# Patient Record
Sex: MALE | Race: WHITE | NOT HISPANIC OR LATINO | Employment: UNEMPLOYED | URBAN - METROPOLITAN AREA
[De-identification: names, ages, dates, MRNs, and addresses within clinical notes are randomized per-mention and may not be internally consistent; named-entity substitution may affect disease eponyms.]

---

## 2021-08-18 ENCOUNTER — APPOINTMENT (EMERGENCY)
Dept: RADIOLOGY | Facility: HOSPITAL | Age: 26
End: 2021-08-18

## 2021-08-18 ENCOUNTER — HOSPITAL ENCOUNTER (OUTPATIENT)
Facility: HOSPITAL | Age: 26
Setting detail: OBSERVATION
Discharge: HOME | End: 2021-08-19
Attending: EMERGENCY MEDICINE | Admitting: INTERNAL MEDICINE

## 2021-08-18 DIAGNOSIS — R20.2 PARESTHESIAS: ICD-10-CM

## 2021-08-18 DIAGNOSIS — R06.00 DYSPNEA: Primary | ICD-10-CM

## 2021-08-18 DIAGNOSIS — Z72.0 TOBACCO ABUSE: ICD-10-CM

## 2021-08-18 PROBLEM — A35 TETANUS: Status: ACTIVE | Noted: 2021-08-18

## 2021-08-18 PROBLEM — R06.02 SOB (SHORTNESS OF BREATH): Status: ACTIVE | Noted: 2021-08-18

## 2021-08-18 LAB
ALBUMIN SERPL BCP-MCNC: 4.1 G/DL (ref 3.5–5)
ALP SERPL-CCNC: 70 U/L (ref 46–116)
ALT SERPL W P-5'-P-CCNC: 35 U/L (ref 12–78)
AMPHETAMINES SERPL QL SCN: NEGATIVE
ANION GAP SERPL CALCULATED.3IONS-SCNC: 14 MMOL/L (ref 4–13)
APAP SERPL-MCNC: <2 UG/ML (ref 10–20)
AST SERPL W P-5'-P-CCNC: 17 U/L (ref 5–45)
BARBITURATES UR QL: NEGATIVE
BASOPHILS # BLD AUTO: 0.06 THOUSANDS/ΜL (ref 0–0.1)
BASOPHILS NFR BLD AUTO: 1 % (ref 0–1)
BENZODIAZ UR QL: NEGATIVE
BILIRUB SERPL-MCNC: 0.8 MG/DL (ref 0.2–1)
BUN SERPL-MCNC: 14 MG/DL (ref 5–25)
CALCIUM SERPL-MCNC: 9.4 MG/DL (ref 8.3–10.1)
CHLORIDE SERPL-SCNC: 104 MMOL/L (ref 100–108)
CK MB SERPL-MCNC: 0.7 NG/ML (ref 0–5)
CK MB SERPL-MCNC: <1 % (ref 0–2.5)
CK SERPL-CCNC: 164 U/L (ref 39–308)
CO2 SERPL-SCNC: 22 MMOL/L (ref 21–32)
COCAINE UR QL: NEGATIVE
CREAT SERPL-MCNC: 1.06 MG/DL (ref 0.6–1.3)
D DIMER PPP FEU-MCNC: 0.3 UG/ML FEU
EOSINOPHIL # BLD AUTO: 0.13 THOUSAND/ΜL (ref 0–0.61)
EOSINOPHIL NFR BLD AUTO: 1 % (ref 0–6)
ERYTHROCYTE [DISTWIDTH] IN BLOOD BY AUTOMATED COUNT: 11.1 % (ref 11.6–15.1)
ERYTHROCYTE [SEDIMENTATION RATE] IN BLOOD: <1 MM/HOUR (ref 0–14)
ETHANOL SERPL-MCNC: <3 MG/DL (ref 0–3)
GFR SERPL CREATININE-BSD FRML MDRD: 96 ML/MIN/1.73SQ M
GLUCOSE SERPL-MCNC: 101 MG/DL (ref 65–140)
HCT VFR BLD AUTO: 43.8 % (ref 36.5–49.3)
HGB BLD-MCNC: 15.5 G/DL (ref 12–17)
IMM GRANULOCYTES # BLD AUTO: 0.03 THOUSAND/UL (ref 0–0.2)
IMM GRANULOCYTES NFR BLD AUTO: 0 % (ref 0–2)
LYMPHOCYTES # BLD AUTO: 2.04 THOUSANDS/ΜL (ref 0.6–4.47)
LYMPHOCYTES NFR BLD AUTO: 22 % (ref 14–44)
MAGNESIUM SERPL-MCNC: 1.9 MG/DL (ref 1.6–2.6)
MCH RBC QN AUTO: 32.4 PG (ref 26.8–34.3)
MCHC RBC AUTO-ENTMCNC: 35.4 G/DL (ref 31.4–37.4)
MCV RBC AUTO: 91 FL (ref 82–98)
METHADONE UR QL: NEGATIVE
MONOCYTES # BLD AUTO: 0.96 THOUSAND/ΜL (ref 0.17–1.22)
MONOCYTES NFR BLD AUTO: 10 % (ref 4–12)
NEUTROPHILS # BLD AUTO: 6.23 THOUSANDS/ΜL (ref 1.85–7.62)
NEUTS SEG NFR BLD AUTO: 66 % (ref 43–75)
NRBC BLD AUTO-RTO: 0 /100 WBCS
OPIATES UR QL SCN: NEGATIVE
OXYCODONE+OXYMORPHONE UR QL SCN: NEGATIVE
PCP UR QL: NEGATIVE
PHOSPHATE SERPL-MCNC: 2.4 MG/DL (ref 2.7–4.5)
PLATELET # BLD AUTO: 249 THOUSANDS/UL (ref 149–390)
PMV BLD AUTO: 9.7 FL (ref 8.9–12.7)
POTASSIUM SERPL-SCNC: 3.9 MMOL/L (ref 3.5–5.3)
PROT SERPL-MCNC: 6.9 G/DL (ref 6.4–8.2)
RBC # BLD AUTO: 4.79 MILLION/UL (ref 3.88–5.62)
SALICYLATES SERPL-MCNC: <3 MG/DL (ref 3–20)
SODIUM SERPL-SCNC: 140 MMOL/L (ref 136–145)
THC UR QL: NEGATIVE
TROPONIN I SERPL-MCNC: <0.02 NG/ML
WBC # BLD AUTO: 9.45 THOUSAND/UL (ref 4.31–10.16)

## 2021-08-18 PROCEDURE — 85379 FIBRIN DEGRADATION QUANT: CPT | Performed by: EMERGENCY MEDICINE

## 2021-08-18 PROCEDURE — 84100 ASSAY OF PHOSPHORUS: CPT | Performed by: EMERGENCY MEDICINE

## 2021-08-18 PROCEDURE — 99245 OFF/OP CONSLTJ NEW/EST HI 55: CPT | Performed by: PHYSICIAN ASSISTANT

## 2021-08-18 PROCEDURE — 82550 ASSAY OF CK (CPK): CPT | Performed by: EMERGENCY MEDICINE

## 2021-08-18 PROCEDURE — 94150 VITAL CAPACITY TEST: CPT

## 2021-08-18 PROCEDURE — 99285 EMERGENCY DEPT VISIT HI MDM: CPT

## 2021-08-18 PROCEDURE — 85652 RBC SED RATE AUTOMATED: CPT | Performed by: EMERGENCY MEDICINE

## 2021-08-18 PROCEDURE — 99220 PR INITIAL OBSERVATION CARE/DAY 70 MINUTES: CPT | Performed by: INTERNAL MEDICINE

## 2021-08-18 PROCEDURE — 80307 DRUG TEST PRSMV CHEM ANLYZR: CPT | Performed by: EMERGENCY MEDICINE

## 2021-08-18 PROCEDURE — 80179 DRUG ASSAY SALICYLATE: CPT | Performed by: EMERGENCY MEDICINE

## 2021-08-18 PROCEDURE — 84484 ASSAY OF TROPONIN QUANT: CPT | Performed by: EMERGENCY MEDICINE

## 2021-08-18 PROCEDURE — 82553 CREATINE MB FRACTION: CPT | Performed by: EMERGENCY MEDICINE

## 2021-08-18 PROCEDURE — 80143 DRUG ASSAY ACETAMINOPHEN: CPT | Performed by: EMERGENCY MEDICINE

## 2021-08-18 PROCEDURE — 93005 ELECTROCARDIOGRAM TRACING: CPT

## 2021-08-18 PROCEDURE — 85025 COMPLETE CBC W/AUTO DIFF WBC: CPT | Performed by: EMERGENCY MEDICINE

## 2021-08-18 PROCEDURE — 36415 COLL VENOUS BLD VENIPUNCTURE: CPT | Performed by: EMERGENCY MEDICINE

## 2021-08-18 PROCEDURE — 99285 EMERGENCY DEPT VISIT HI MDM: CPT | Performed by: EMERGENCY MEDICINE

## 2021-08-18 PROCEDURE — 83735 ASSAY OF MAGNESIUM: CPT | Performed by: EMERGENCY MEDICINE

## 2021-08-18 PROCEDURE — 80053 COMPREHEN METABOLIC PANEL: CPT | Performed by: EMERGENCY MEDICINE

## 2021-08-18 PROCEDURE — 71045 X-RAY EXAM CHEST 1 VIEW: CPT

## 2021-08-18 PROCEDURE — 82077 ASSAY SPEC XCP UR&BREATH IA: CPT | Performed by: EMERGENCY MEDICINE

## 2021-08-18 RX ORDER — ALBUTEROL SULFATE 90 UG/1
2 AEROSOL, METERED RESPIRATORY (INHALATION) ONCE
Status: COMPLETED | OUTPATIENT
Start: 2021-08-18 | End: 2021-08-18

## 2021-08-18 RX ORDER — SODIUM CHLORIDE 9 MG/ML
75 INJECTION, SOLUTION INTRAVENOUS CONTINUOUS
Status: DISCONTINUED | OUTPATIENT
Start: 2021-08-18 | End: 2021-08-19 | Stop reason: HOSPADM

## 2021-08-18 RX ORDER — ACETAMINOPHEN 325 MG/1
650 TABLET ORAL EVERY 6 HOURS PRN
Status: DISCONTINUED | OUTPATIENT
Start: 2021-08-18 | End: 2021-08-19 | Stop reason: HOSPADM

## 2021-08-18 RX ORDER — CYCLOBENZAPRINE HCL 5 MG
5 TABLET ORAL 3 TIMES DAILY PRN
Status: DISCONTINUED | OUTPATIENT
Start: 2021-08-18 | End: 2021-08-19 | Stop reason: HOSPADM

## 2021-08-18 RX ORDER — DIPHENHYDRAMINE HYDROCHLORIDE 50 MG/ML
25 INJECTION INTRAMUSCULAR; INTRAVENOUS EVERY 8 HOURS SCHEDULED
Status: COMPLETED | OUTPATIENT
Start: 2021-08-18 | End: 2021-08-19

## 2021-08-18 RX ORDER — MAGNESIUM SULFATE 1 G/100ML
1 INJECTION INTRAVENOUS 2 TIMES DAILY
Status: COMPLETED | OUTPATIENT
Start: 2021-08-18 | End: 2021-08-19

## 2021-08-18 RX ORDER — ONDANSETRON 2 MG/ML
4 INJECTION INTRAMUSCULAR; INTRAVENOUS EVERY 6 HOURS PRN
Status: DISCONTINUED | OUTPATIENT
Start: 2021-08-18 | End: 2021-08-19 | Stop reason: HOSPADM

## 2021-08-18 RX ORDER — CYCLOBENZAPRINE HCL 5 MG
5 TABLET ORAL 3 TIMES DAILY PRN
COMMUNITY

## 2021-08-18 RX ORDER — METRONIDAZOLE 500 MG/1
500 TABLET ORAL EVERY 8 HOURS SCHEDULED
COMMUNITY

## 2021-08-18 RX ORDER — DIPHENHYDRAMINE HCL 25 MG
25 TABLET ORAL EVERY 8 HOURS PRN
COMMUNITY

## 2021-08-18 RX ORDER — METRONIDAZOLE 500 MG/1
500 TABLET ORAL EVERY 8 HOURS SCHEDULED
Status: DISCONTINUED | OUTPATIENT
Start: 2021-08-18 | End: 2021-08-19 | Stop reason: HOSPADM

## 2021-08-18 RX ORDER — DIPHENHYDRAMINE HCL 25 MG
25 TABLET ORAL EVERY 8 HOURS PRN
Status: DISCONTINUED | OUTPATIENT
Start: 2021-08-18 | End: 2021-08-18

## 2021-08-18 RX ADMIN — DIPHENHYDRAMINE HYDROCHLORIDE 25 MG: 50 INJECTION, SOLUTION INTRAMUSCULAR; INTRAVENOUS at 22:14

## 2021-08-18 RX ADMIN — SODIUM CHLORIDE 75 ML/HR: 0.9 INJECTION, SOLUTION INTRAVENOUS at 22:15

## 2021-08-18 RX ADMIN — METRONIDAZOLE 500 MG: 500 TABLET ORAL at 13:27

## 2021-08-18 RX ADMIN — METRONIDAZOLE 500 MG: 500 TABLET ORAL at 22:14

## 2021-08-18 RX ADMIN — ALBUTEROL SULFATE 2 PUFF: 90 AEROSOL, METERED RESPIRATORY (INHALATION) at 05:08

## 2021-08-18 RX ADMIN — MAGNESIUM SULFATE IN DEXTROSE 1 G: 10 INJECTION, SOLUTION INTRAVENOUS at 14:12

## 2021-08-18 RX ADMIN — SODIUM CHLORIDE 75 ML/HR: 0.9 INJECTION, SOLUTION INTRAVENOUS at 09:01

## 2021-08-18 RX ADMIN — DIPHENHYDRAMINE HYDROCHLORIDE 25 MG: 50 INJECTION, SOLUTION INTRAMUSCULAR; INTRAVENOUS at 14:11

## 2021-08-18 RX ADMIN — MAGNESIUM SULFATE IN DEXTROSE 1 G: 10 INJECTION, SOLUTION INTRAVENOUS at 22:00

## 2021-08-18 NOTE — CONSULTS
Consultation - Neurology   Sheldon Mayo 31 y o  male MRN: [id number]  Unit/Bed#: -01 Encounter: [id number]      Assessment/Plan     Paresthesias  Assessment & Plan   31 y o  RHD  male who [facility] on [facility]/18/21 due to episode of dyspnea and L sided chest "warmth" that started early this morning when pt was trying to fall asleep  Pt reports he started having issues a few weeks ago after being stung by a bee  Pt started breaking out in hives, so he took PO Benadryl  Then, about 2 weeks ago, pt was again stung by bees, so he again took PO Benadryl  Since then, pt has periodically been experiencing small areas of hives for which he will take PO Benadryl  Last Saturday, pt was driving to [place] from a [employer] party, when he experienced numbness and tingling in his hands, feet, and face (radiating into R side of neck)  Then, pt developed full body contractions which involved his torso, back, and extremities, so severe that when he was turning into the ED in [place], he had to turn the steering wheel of his care with his face and shoulders  Pt denies any body shaking, LOC, or bladder/bowl incontinence during this episode  Pt was admitted to a hospital in [place] from 8/14/21-8/17/21 and was diagnosed with Tetanus (pt was not up to date on Tdap and had a cut on his L hand)  He was treated with human tetanus immunoglobulin and was vaccinated for Tdap during this hospitalization  Pt then began driving up to [place] from [place], and about 2 hours into car ride, pt developed numbness and tingling in his hands, feet, and face again  After numbness and tingling developed, pt broke out in an erythematous rash in at least his LUE  Pt presented to [facility] at that time for further evaluation  By the time he was evaluated in the ED, his symptoms had resolved  Drug screen, CBC, CK, and routine chemistry were unremarkable, so pt was discharged from the ED   Pt then arrived at his sister's home after discharge  Pt reports when he was trying to sleep in the early morning hours, he was able to take a deep breath in, but he was having difficulty exhaling  He reports that every time he would start to drift into sleep, he would wake himself up because he wasn't remembering to exhale in sleep and that would jerk himself awake  This sensation was associated with L chest "warmth " Pt states he may have had some b/l hand paresthesias at that time but isn't entirely sure  Pt was brought to [facility] ED for further evaluation  Today, pt is unsure if he is experiencing paresthesias in his b/l hand fingertips  He is unsure if he is experiencing paresthesias versus if his hands are cold  During interview and examination, pt did experience some non-rhythmic twitching of his RUE that spontaneously resolved within a few seconds  On exam 8/18/21: Mental status intact, cranial nerves 2-12 grossly intact, strength 5/5 throughout all 4 extremities, sensation intact to light touch and pinprick x 4 extremities, gait normal, finger to nose intact, reflexes present     Symptoms may be attributed to tetanus    Workup  - Labs  - BMP: sodium 140, potassium 3 9, BUN 14, glucose 101, creatinine 1 06  - CBC: hemoglobin 15 5, hematocrit 43 8, WBC 9 45, platelets 187   Plan  - Start magnesium 1 g BID x 2 doses   - Start Benadryl 25 mg IV Q8H x 3 doses   - Medical management and supportive care per primary team   Correction of any metabolic or infectious disturbances  Recommendations for outpatient neurological follow up have yet to be determined  History of Present Illness     Reason for Consult / Principal Problem: paresthesias, dyspnea  Hx and PE limited by: N/A  HPI: Deshawn Tavares is a 31 y o  RHD  male who presents to [facility] on 8/18/21 due to episode of dyspnea and L sided chest "warmth" that started early this morning when pt was trying to fall asleep       Pt reports he started having issues a few weeks ago after being stung by a bee  Pt started breaking out in hives, so he took PO Benadryl  Then, about 2 weeks ago, pt was again stung by bees, so he again took PO Benadryl  Since then, pt has periodically been experiencing small areas of hives for which he will take PO Benadryl  Last Saturday, pt was driving back from a [employer] party, when he experienced numbness and tingling in his hands, feet, and face (radiating into R side of neck)  Then, pt developed full body contractions which involved his torso, back, and extremities, so severe that when he was turning into the ED in [place], he had to turn the steering wheel of his care with his face and shoulders  Pt denies any body shaking, LOC, or bladder/bowl incontinence during this episode  Pt was admitted to a hospital in [place] from 8/14/21-8/17/21 and was diagnosed with Tetanus (pt was not up to date on Tdap and had a cut on his L hand)  He was treated with human tetanus immunoglobulin and was vaccinated for Tdap during this hospitalization  Pt then began driving up to [place] from [place], and about 2 hours into car ride, pt developed numbness and tingling in his hands, feet, and face again  After numbness and tingling developed, pt broke out in an erythematous rash in at least his LUE, and pt experienced some throat tightness at this time  Pt presented to [address] [place] [address] at that time for further evaluation  By the time he was evaluated in the ED, his symptoms/associated symptoms had resolved  Drug screen, CBC, CK, and routine chemistry were unremarkable, so pt was discharged from the ED  Pt then arrived at his sister's home after discharge  Pt reports when he was trying to sleep in the early morning hours, he was able to take a deep breath in, but he was having difficulty exhaling   He reports that every time he would start to drift into sleep, he would wake himself up because he wasn't remembering to exhale in sleep and that would jerk himself awake  This sensation was associated with L chest "warmth " Pt states he may have had some b/l hand paresthesias at that time but isn't entirely sure  [facility] for further evaluation  Today, pt is unsure if he is experiencing paresthesias in his b/l hand fingertips  He is unsure if he is experiencing paresthesias versus if his hands are cold  He denies muscle spasms/contractures at this time  Pt denies weakness, headache, syncopal episode, seizure-like activity, bladder/bowel issues, nausea, vomitting, chest pain, and SOB at rest  Pt endorses some feelings of L lower chest/LUQ of abdomen tightness where he was experiencing the area of warmth overnight  During interview and examination, pt did experience some non-rhythmic twitching of his RUE that spontaneously resolved within a few seconds  Pt denies having a history of seizures  Pt denies recent camping trip  Pt denies recent travel outside of the country  Pt has not been vaccinated for COVID  Inpatient consult to Neurology  Consult performed by: Niels Schroeder PA-C  Consult ordered by: Lelia Nails MD          Review of Systems   Constitutional: Negative for chills and fever  HENT: Negative for trouble swallowing  Eyes: Negative for pain and visual disturbance  Respiratory: Negative for cough and shortness of breath  Cardiovascular: Negative for chest pain and palpitations  Gastrointestinal: Negative for nausea and vomiting  Genitourinary: Negative for difficulty urinating and dysuria  Musculoskeletal: Negative for gait problem and neck pain  Neurological: Negative for dizziness, tremors, seizures, syncope, facial asymmetry, speech difficulty, weakness, light-headedness, numbness and headaches  Psychiatric/Behavioral: Negative for agitation and confusion  Historical Information   History reviewed  No pertinent past medical history  History reviewed   No pertinent surgical history  Social History   Social History     Substance and Sexual Activity   Alcohol Use Not Currently     Social History     Substance and Sexual Activity   Drug Use Not on file     E-Cigarette/Vaping     E-Cigarette/Vaping Substances     Social History     Tobacco Use   Smoking Status Current Some Day Smoker   Smokeless Tobacco Former User     Family History: History reviewed  No pertinent family history  Review of previous medical records was  completed  Meds/Allergies   current meds:   Current Facility-Administered Medications   Medication Dose Route Frequency    acetaminophen (TYLENOL) tablet 650 mg  650 mg Oral Q6H PRN    cyclobenzaprine (FLEXERIL) tablet 5 mg  5 mg Oral TID PRN    diphenhydrAMINE (BENADRYL) injection 25 mg  25 mg Intravenous Q8H [facility]    magnesium sulfate IVPB (premix) SOLN 1 g  1 g Intravenous BID    metroNIDAZOLE (FLAGYL) tablet 500 mg  500 mg Oral Q8H [facility]    ondansetron (ZOFRAN) injection 4 mg  4 mg Intravenous Q6H PRN    sodium chloride 0 9 % infusion  75 mL/hr Intravenous Continuous    and PTA meds:   Prior to Admission Medications   Prescriptions Last Dose Informant Patient Reported? Taking? cyclobenzaprine (FLEXERIL) 5 mg tablet   Yes Yes   Sig: Take 5 mg by mouth 3 (three) times a day as needed for muscle spasms   diphenhydrAMINE (BENADRYL) 25 mg tablet   Yes Yes   Sig: Take 25 mg by mouth every 8 (eight) hours as needed for itching   metroNIDAZOLE (FLAGYL) 500 mg tablet   Yes Yes   Sig: Take 500 mg by mouth every 8 (eight) hours Every 8 hours for 8 days      Facility-Administered Medications: None       Allergies   Allergen Reactions    Bee Venom Hives       Objective   Vitals:Blood pressure 130/82, pulse 79, temperature 98 1 °F (36 7 °C), temperature source Oral, resp  rate 16, height 5' 7" (1 702 m), weight 74 5 kg (164 lb 3 9 oz), SpO2 97 %  ,Body mass index is 25 72 kg/m²    No intake or output data in the 24 hours ending 08/18/21 1350    Invasive Devices: Invasive Devices     Peripheral Intravenous Line            Peripheral IV 08/18/21 Left Antecubital <1 day                Physical Exam  Vitals and nursing note reviewed  Constitutional:       General: He is not in acute distress  Appearance: He is not diaphoretic  HENT:      Head: Normocephalic and atraumatic  Nose: Nose normal  No congestion or rhinorrhea  Mouth/Throat:      Mouth: Mucous membranes are moist       Pharynx: Oropharynx is clear  No oropharyngeal exudate or posterior oropharyngeal erythema  Eyes:      General: No scleral icterus  Right eye: No discharge  Left eye: No discharge  Extraocular Movements: Extraocular movements intact  Conjunctiva/sclera: Conjunctivae normal       Pupils: Pupils are equal, round, and reactive to light  Cardiovascular:      Rate and Rhythm: Normal rate  Pulmonary:      Effort: Pulmonary effort is normal    Musculoskeletal:         General: Normal range of motion  Cervical back: Normal range of motion  No rigidity or tenderness  Right lower leg: No edema  Left lower leg: No edema  Skin:     Findings: Erythema and rash (small area of erythematous maculopapular rash on R upper torso, nonpruritic) present  Neurological:      Mental Status: He is alert and oriented to person, place, and time  Coordination: Finger-Nose-Finger Test normal       Deep Tendon Reflexes: Strength normal       Reflex Scores:       Bicep reflexes are 2+ on the right side and 2+ on the left side  Brachioradialis reflexes are 1+ on the right side and 1+ on the left side  Patellar reflexes are 3+ on the right side and 3+ on the left side  Achilles reflexes are 1+ on the right side and 1+ on the left side  Psychiatric:         Speech: Speech normal       Comments: Slightly flat affect, cooperative with exam        Neurologic Exam     Mental Status   Oriented to person, place, and time  Follows 2 step commands  Attention: normal  Concentration: normal    Speech: speech is normal   Level of consciousness: alert    Cranial Nerves     CN II   Visual fields full to confrontation  Visual acuity: normal  Right visual field deficit: none  Left visual field deficit: none     CN III, IV, VI   Pupils are equal, round, and reactive to light  Right pupil: Size: 3 mm  Shape: regular  Reactivity: brisk  Consensual response: intact  Accommodation: intact  Left pupil: Size: 3 mm  Shape: regular  Reactivity: brisk  Consensual response: intact  Accommodation: intact  CN III: no CN III palsy  CN VI: no CN VI palsy  Nystagmus: none     CN V   Facial sensation intact  CN VII   Facial expression full, symmetric  CN VIII   Hearing impaired: grossly intact     CN IX, X   CN IX normal    CN X normal    Palate: symmetric    CN XI   CN XI normal    Right sternocleidomastoid strength: normal  Left sternocleidomastoid strength: normal  Right trapezius strength: normal  Left trapezius strength: normal    CN XII   CN XII normal    Tongue: not atrophic  Fasciculations: absent  Tongue deviation: none    Motor Exam   Muscle bulk: normal  Overall muscle tone: normal  Right arm pronator drift: absent  Left arm pronator drift: absent    Strength   Strength 5/5 throughout   - non-rhythmic RUE twitching lasting about 15 seconds, stopped spontaneously      Sensory Exam   Light touch normal    Vibration normal    Pinprick normal      Gait, Coordination, and Reflexes     Gait  Gait: (deferred for patient safety )    Coordination   Finger to nose coordination: normal    Tremor   Resting tremor: absent  Intention tremor: absent    Reflexes   Right brachioradialis: 1+  Left brachioradialis: 1+  Right biceps: 2+  Left biceps: 2+  Right patellar: 3+  Left patellar: 3+  Right achilles: 1+  Left achilles: 1+  Right plantar: equivocal  Left plantar: equivocal      Lab Results:   CBC:   Results from last 7 days   Lab Units 08/18/21  0249   WBC Thousand/uL 9  45   RBC Million/uL 4 79   HEMOGLOBIN g/dL 15 5   HEMATOCRIT % 43 8   MCV fL 91   PLATELETS Thousands/uL 249   , BMP/CMP:   Results from last 7 days   Lab Units 08/18/21  0249   SODIUM mmol/L 140   POTASSIUM mmol/L 3 9   CHLORIDE mmol/L 104   CO2 mmol/L 22   BUN mg/dL 14   CREATININE mg/dL 1 06   CALCIUM mg/dL 9 4   AST U/L 17   ALT U/L 35   ALK PHOS U/L 70   EGFR ml/min/1 73sq m 96     Imaging Studies: I have personally reviewed pertinent reports  and I have personally reviewed pertinent films in PACS CXR 8/18/21  EKG, Pathology, and Other Studies: I have personally reviewed pertinent reports  EKG 8/18/21      Code Status: Level 1 - Full Code    Counseling / Coordination of Care  Total time spent today 60 minutes  Greater than 50% of total time was spent with the patient and / or family counseling and / or coordination of care   A description of the counseling / coordination of care: discussion of potential tetanus versus immune response from bee stings, plan for treatment and workup

## 2021-08-18 NOTE — H&P
[place]  H&P- [address] 1995, 31 y o  male MRN: [id number]  Unit/Bed#: -01 Encounter: [id number]  Primary Care Provider: No primary care provider on file  Date and time admitted to hospital: 8/18/2021  2:37 AM    * SOB (shortness of breath)  Assessment & Plan  · While in the car had numbness of the face/neck with sudden shortness of breath  · Describes difficulty with exhaling not inhaling  · Denies prior history of anxiety  · Has resolved at this time in the ED  · Chest x-ray is unremarkable  · D-dimer negative  · 98% on room air  · NIF and vital capacity Q shift  · Outpatient sleep study and pulmonary follow-up    Paresthesias  Assessment & Plan  · Prior to ED arrival had tingling in hands, feet and face  Resolved now  Has had multiple episodes of this over the last week  · Had been admitted in [place] with suspected tetanus  Seen at [facility] yesterday in the ER and discharged  · Neuro checks q 4 hours  · Consult neurology    Tetanus  Assessment & Plan  · Had been treated at hospital in [place] for presumed tetanus from 08/15 through 8/17  · Symptoms:  2 episodes where his entire body became stiff/tight, tingling in hands/feet  · Denies LOC, seizure, shortness of breath at that time  · Received tetanus shot, IVIG and started on p o  Flagyl    Anticipated length of stay less than 2 midnights    Chief Complaint   Patient presents with    Shortness of Breath     Pt c/o of getting osb while talking and ferels tingling in his lips and fingers  HPI:  [address] is a 31 y o  male who presented to ER with episode of shortness of breath, tingling in lips and fingers while driving from [place] to [place]  Pt has been evaluated in 2 EDs in the past week during his drive for similar episodes prior to arrival to [facility]   Pt was discharged from [address] [place] [address] on 8/18/21 after unremarkable workup in urine toxicology, CBC, CK, and routine chemistry  Patient is driving from [place] to [place] to attend a wedding  Patient during his drive developed an episode of whole body paresthesias and stiffness and went to ER in [place] where he was treated for tetanus  Patient was admitted 8/14-8/17/21 at [facility] where he received tetanus immunoglobulin and vaccine and was discharged on Flagyl, Flexeril and Benadryl p r n  Patient was discharged from [facility] and on his way back again had feeling of tingling of hands, feet and face and patient went to [facility] ER where he had unremarkable workup and was discharged home  Patient went to his sister's house in [facility] and states he felt short of breath when he was trying to sleep  He states that he felt like he was not able to exhale completely and came to ER  Patient had negative D-dimer, troponin, UDS is negative in ER and ER provider spoke with Neurology who recommended admitting for observation  Patient does not have any tingling/paresthesias/numbness or weakness of extremities  Denies any chest pain, shortness of breath, nausea, vomiting, fever, chills, abdominal pain  Patient is not COVID vaccinated  Historical Information   History reviewed  No pertinent past medical history  History reviewed  No pertinent surgical history  Social History   Social History     Substance and Sexual Activity   Alcohol Use Not Currently     Social History     Substance and Sexual Activity   Drug Use Not on file     Social History     Tobacco Use   Smoking Status Current Some Day Smoker   Smokeless Tobacco Former User     History reviewed  No pertinent family history      Meds/Allergies   Allergies   Allergen Reactions    Bee Venom Hives       Meds:    Current Facility-Administered Medications:     acetaminophen (TYLENOL) tablet 650 mg, 650 mg, Oral, Q6H PRN, Jas Nance MD    ondansetron (ZOFRAN) injection 4 mg, 4 mg, Intravenous, Q6H PRN, Jimmy Kerby Litten, MD    sodium chloride 0 9 % infusion, 75 mL/hr, Intravenous, Continuous, Grover Rider MD, Last Rate: 75 mL/hr at 08/18/21 0901, 75 mL/hr at 08/18/21 0901    Medications Prior to Admission   Medication    cyclobenzaprine (FLEXERIL) 5 mg tablet    diphenhydrAMINE (BENADRYL) 25 mg tablet    metroNIDAZOLE (FLAGYL) 500 mg tablet         Review of Systems   Constitutional: Positive for activity change  HENT: Negative  Eyes: Negative  Respiratory: Positive for shortness of breath  Cardiovascular: Negative  Gastrointestinal: Negative  Endocrine: Negative  Genitourinary: Negative  Musculoskeletal: Negative  Skin: Negative  Allergic/Immunologic: Negative  Neurological: Negative  Hematological: Negative  Psychiatric/Behavioral: Negative  Current Vitals:   Blood Pressure: 130/82 (08/18/21 0756)  Pulse: 79 (08/18/21 0756)  Temperature: 98 1 °F (36 7 °C) (08/18/21 0756)  Temp Source: Oral (08/18/21 0756)  Respirations: 16 (08/18/21 0756)  Height: 5' 7" (170 2 cm) (08/18/21 0424)  Weight - Scale: 74 5 kg (164 lb 3 9 oz) (08/18/21 0753)  SpO2: 97 % (08/18/21 0756)  SPO2 RA Rest      ED to Hosp-Admission (Current) from 8/18/2021 in [address] [facility]   SpO2  97 %   SpO2 Activity  At Rest   O2 Device  None (Room air)   O2 Flow Rate  --        No intake or output data in the 24 hours ending 08/18/21 1057  Body mass index is 25 72 kg/m²  Physical Exam  Vitals and nursing note reviewed  Constitutional:       General: He is not in acute distress  Appearance: He is well-developed  HENT:      Head: Normocephalic and atraumatic  Eyes:      General: No scleral icterus  Conjunctiva/sclera: Conjunctivae normal       Pupils: Pupils are equal, round, and reactive to light  Neck:      Thyroid: No thyromegaly  Vascular: No JVD  Cardiovascular:      Rate and Rhythm: Normal rate and regular rhythm        Heart sounds: Normal heart sounds  Pulmonary:      Effort: Pulmonary effort is normal  No respiratory distress  Breath sounds: Normal breath sounds  No wheezing or rales  Chest:      Chest wall: No tenderness  Abdominal:      General: Bowel sounds are normal  There is no distension  Palpations: Abdomen is soft  There is no mass  Tenderness: There is no abdominal tenderness  There is no guarding or rebound  Musculoskeletal:         General: No tenderness or deformity  Normal range of motion  Cervical back: Normal range of motion and neck supple  Lymphadenopathy:      Cervical: No cervical adenopathy  Skin:     General: Skin is warm  Coloration: Skin is not pale  Findings: No erythema or rash  Neurological:      General: No focal deficit present  Mental Status: He is alert and oriented to person, place, and time  Mental status is at baseline  Cranial Nerves: No cranial nerve deficit  Coordination: Coordination normal       Comments: No slurred speech  No pronator drift    No sensory or motor deficits   Psychiatric:         Behavior: Behavior normal          Judgment: Judgment normal          Lab Results:   CBC:   Lab Results   Component Value Date    WBC 9 45 08/18/2021    HGB 15 5 08/18/2021    HCT 43 8 08/18/2021    MCV 91 08/18/2021     08/18/2021    MCH 32 4 08/18/2021    MCHC 35 4 08/18/2021    RDW 11 1 (L) 08/18/2021    MPV 9 7 08/18/2021    NRBC 0 08/18/2021     CMP:  Lab Results   Component Value Date     08/18/2021    CO2 22 08/18/2021    BUN 14 08/18/2021    CREATININE 1 06 08/18/2021    CALCIUM 9 4 08/18/2021    AST 17 08/18/2021    ALT 35 08/18/2021    ALKPHOS 70 08/18/2021    EGFR 96 08/18/2021     Lab Results   Component Value Date    TROPONINI <0 02 08/18/2021    CKMB 0 7 08/18/2021    CKTOTAL 164 08/18/2021     Coagulation: No results found for: PT, INR, APTT Urinalysis:No results found for: Juan Miguel Brown, [facility], LEUKOCYTESUR, NITRITE, PROTEINUA, JOANA Leon, BLOODU   Amylase: No results found for: AMYLASE  Lipase: No results found for: LIPASE     Imaging: XR chest 1 view portable    Result Date: 8/18/2021  Narrative: CHEST INDICATION:   Shortness of breath  COMPARISON:  None EXAM PERFORMED/VIEWS:  XR CHEST PORTABLE FINDINGS: Cardiomediastinal silhouette appears unremarkable  The lungs are clear  No pneumothorax or pleural effusion  Osseous structures appear within normal limits for patient age  Impression: No acute cardiopulmonary disease  Workstation performed: [id number]     EKG, Pathology, and Other Studies: I have personally reviewed the results  VTE Pharmacologic Prophylaxis: Reason for no pharmacologic prophylaxis Patient is ambulatory  VTE Mechanical Prophylaxis: reason for no mechanical VTE prophylaxis Patient is ambulatory    Code Status: Level 1 - Full Code    Counseling / Coordination of Care  Total floor / unit time spent today 75 minutes  Greater than 50% of total time was spent with the patient and / or family counseling and / or coordination of care       "This note has been constructed using a voice recognition system"      Tamara Boo MD  8/18/2021, 10:57 AM

## 2021-08-18 NOTE — RESPIRATORY THERAPY NOTE
As per report from Gen Covarrubias RRT pt's NIF and VC were done at [id number] 8/18/21 values were NIF-60 cmh2o/VC 6 8L (see resp   Assessment flow sheet)

## 2021-08-18 NOTE — OCCUPATIONAL THERAPY NOTE
Occupational Therapy Screen    Patient Name: Kiera Clarke  MYZBG'O Date: 8/18/2021 08/18/21 1110   OT Last Visit   OT Visit Date 08/18/21   Note Type   Note type Screen   Assessment   Assessment OT orders received and chart reviewed  Spoke to Yanick Jiang who states pt is currently independent in room  Spoke to pt who confirms the same and does not express any OT needs  Recommend pt continue to be OOB for meals, ambulation to/from BR, perform self care tasks, and mobility in hallway with nursing  At this time, OT recommendations at time of discharge are return home at prior level of function  No acute OT needs identified at this time; please re-consult if OT needs arise during remainder of hospital stay       [place], MS, OTR/L

## 2021-08-18 NOTE — ASSESSMENT & PLAN NOTE
· Prior to ED arrival had tingling in hands, feet and face  Resolved now  Has had multiple episodes of this over the last week  · Had been admitted in [place] with suspected tetanus    Seen at [facility] yesterday in the ER and discharged  · Neuro checks q 4 hours  · Consult neurology

## 2021-08-18 NOTE — ASSESSMENT & PLAN NOTE
· Had been treated at hospital in [place] for presumed tetanus from 08/15 through 8/17  · Symptoms:  2 episodes where his entire body became stiff/tight, tingling in hands/feet  · Denies LOC, seizure, shortness of breath at that time  · Received tetanus shot, IVIG and started on p o   Flagyl

## 2021-08-18 NOTE — PLAN OF CARE
Problem: PAIN - ADULT  Goal: Verbalizes/displays adequate comfort level or baseline comfort level  Description: Interventions:  - Encourage patient to monitor pain and request assistance  - Assess pain using appropriate pain scale  - Administer analgesics based on type and severity of pain and evaluate response  - Implement non-pharmacological measures as appropriate and evaluate response  - Consider cultural and social influences on pain and pain management  - Notify physician/advanced practitioner if interventions unsuccessful or patient reports new pain  Outcome: Progressing     Problem: INFECTION - ADULT  Goal: Absence or prevention of progression during hospitalization  Description: INTERVENTIONS:  - Assess and monitor for signs and symptoms of infection  - Monitor lab/diagnostic results  - Monitor all insertion sites, i e  indwelling lines, tubes, and drains  - Monitor endotracheal if appropriate and nasal secretions for changes in amount and color  - [place] appropriate cooling/warming therapies per order  - Administer medications as ordered  - Instruct and encourage patient and family to use good hand hygiene technique  - Identify and instruct in appropriate isolation precautions for identified infection/condition  Outcome: Progressing  Goal: Absence of fever/infection during neutropenic period  Description: INTERVENTIONS:  - Monitor WBC    Outcome: Progressing     Problem: SAFETY ADULT  Goal: Patient will remain free of falls  Description: INTERVENTIONS:  - Educate patient/family on patient safety including physical limitations  - Instruct patient to call for assistance with activity   - Consult OT/PT to assist with strengthening/mobility   - Keep Call bell within reach  - Keep bed low and locked with side rails adjusted as appropriate  - Keep care items and personal belongings within reach  - Initiate and maintain comfort rounds  - Make Fall Risk Sign visible to staff  - Offer Toileting every 2 Hours, in advance of need  - Initiate/Maintain alarm  - Obtain necessary fall risk management equipment:   - Apply yellow socks and bracelet for high fall risk patients  - Consider moving patient to room near nurses station  Outcome: Progressing  Goal: Maintain or return to baseline ADL function  Description: INTERVENTIONS:  -  Assess patient's ability to carry out ADLs; assess patient's baseline for ADL function and identify physical deficits which impact ability to perform ADLs (bathing, care of mouth/teeth, toileting, grooming, dressing, etc )  - Assess/evaluate cause of self-care deficits   - Assess range of motion  - Assess patient's mobility; develop plan if impaired  - Assess patient's need for assistive devices and provide as appropriate  - Encourage maximum independence but intervene and supervise when necessary  - Involve family in performance of ADLs  - Assess for home care needs following discharge   - Consider OT consult to assist with ADL evaluation and planning for discharge  - Provide patient education as appropriate  Outcome: Progressing  Goal: Maintains/Returns to pre admission functional level  Description: INTERVENTIONS:  - Perform BMAT or MOVE assessment daily    - Set and communicate daily mobility goal to care team and patient/family/caregiver  - Collaborate with rehabilitation services on mobility goals if consulted  - Perform Range of Motion 3 times a day  - Reposition patient every 3 hours    - Dangle patient 3 times a day  - Stand patient 3 times a day  - Ambulate patient 3 times a day  - Out of bed to chair 3 times a day   - Out of bed for meals 3  Problem: DISCHARGE PLANNING  Goal: Discharge to home or other facility with appropriate resources  Description: INTERVENTIONS:  - Identify barriers to discharge w/patient and caregiver  - Arrange for needed discharge resources and transportation as appropriate  - Identify discharge learning needs (meds, wound care, etc )  - Arrange for interpretive services to assist at discharge as needed  - Refer to Case Management Department for coordinating discharge planning if the patient needs post-hospital services based on physician/advanced practitioner order or complex needs related to functional status, cognitive ability, or social support system  Outcome: Progressing     Problem: Knowledge Deficit  Goal: Patient/family/caregiver demonstrates understanding of disease process, treatment plan, medications, and discharge instructions  Description: Complete learning assessment and assess knowledge base  Interventions:  - Provide teaching at level of understanding  - Provide teaching via preferred learning methods  Outcome: Progressing     Problem: NEUROSENSORY - ADULT  Goal: Achieves stable or improved neurological status  Description: INTERVENTIONS  - Monitor and report changes in neurological status  - Monitor vital signs such as temperature, blood pressure, glucose, and any other labs ordered   - Initiate measures to prevent increased intracranial pressure  - Monitor for seizure activity and implement precautions if appropriate      Outcome: Progressing  Goal: Achieves maximal functionality and self care  Description: INTERVENTIONS  - Monitor swallowing and airway patency with patient fatigue and changes in neurological status  - Encourage and assist patient to increase activity and self care     - Encourage visually impaired, hearing impaired and aphasic patients to use assistive/communication devices  Outcome: Progressing     Problem: METABOLIC, FLUID AND ELECTROLYTES - ADULT  Goal: Electrolytes maintained within normal limits  Description: INTERVENTIONS:  - Monitor labs and assess patient for signs and symptoms of electrolyte imbalances  - Administer electrolyte replacement as ordered  - Monitor response to electrolyte replacements, including repeat lab results as appropriate  - Instruct patient on fluid and nutrition as appropriate  Outcome: Progressing  Goal: Fluid balance maintained  Description: INTERVENTIONS:  - Monitor labs   - Monitor I/O and WT  - Instruct patient on fluid and nutrition as appropriate  - Assess for signs & symptoms of volume excess or deficit  Outcome: Progressing     Problem: MUSCULOSKELETAL - ADULT  Goal: Maintain or return mobility to safest level of function  Description: INTERVENTIONS:  - Assess patient's ability to carry out ADLs; assess patient's baseline for ADL function and identify physical deficits which impact ability to perform ADLs (bathing, care of mouth/teeth, toileting, grooming, dressing, etc )  - Assess/evaluate cause of self-care deficits   - Assess range of motion  - Assess patient's mobility  - Assess patient's need for assistive devices and provide as appropriate  - Encourage maximum independence but intervene and supervise when necessary  - Involve family in performance of ADLs  - Assess for home care needs following discharge   - Consider OT consult to assist with ADL evaluation and planning for discharge  - Provide patient education as appropriate  Outcome: Progressing  Goal: Maintain proper alignment of affected body part  Description: INTERVENTIONS:  - Support, maintain and protect limb and body alignment  - Provide patient/ family with appropriate education  Outcome: Progressing    times a day  - Out of bed for toileting  - Record patient progress and toleration of activity level   Outcome: Progressing

## 2021-08-18 NOTE — CASE MANAGEMENT
LOS 0  Unplanned readmission risk score: N/A  Not a bundle  Met with pt to discuss the role of CM and to discuss any help pt may need prior to dc  Pt lives alone in [place]; pt is in [place] visiting family  Pt reports his parents live next door  Pt has a 1st floor home with 3 [address]  Pt performed ADL's indptly pta, no use of DME  No hx of HHC or rehab  No hx of mental health or D&A treatment  Pt's preferred pharmacy is [facility] or [facility]; no specific location  Pt drives  Pt reports not having insurance; referral made to [facility]  Contact: Genoveva Roland (sister) [phone]  No POA or living will--declined information  Genoveva Roland will transport home at [place]  CM reviewed d/c planning process including the following: identifying help at home, patient preference for d/c planning needs, availability of treatment team to discuss questions or concerns patient and/or family may have regarding understanding medications and recognizing signs and symptoms once discharged  CM also encouraged patient to follow up with all recommended appointments after discharge  Patient advised of importance for patient and family to participate in managing patients medical well being

## 2021-08-18 NOTE — ED PROVIDER NOTES
History  Chief Complaint   Patient presents with    Shortness of Breath     Pt c/o of getting osb while talking and ferels tingling in his lips and fingers  32 yo M with no significant PMH, no drug use, social ETOH/social cigars presents to ED for eval of dyspnea, and tingling in hands, feet and face  BIBA  Apparently, this is his third hospital visit this week  He is from [place], he was traveling up to [place] for a family wedding  On the drive up, he had an episode where his whole body got stiff and tight, no LOC or seizure, and he felt like his throat was tight, he was eval'd at ER in [place] and treated for tetanus (he had a cut on his hand about a week prior, and was out of date with tetanus)  He was inpt for about 3 days  Had a 2nd episode in hospital prior to admission  No repeat episodes like that since that time  At d/c 8/17/21 3pm he was feeling improved  No SOB at that time  On continued drive up [facility], he had tingling in hands/feet again, was eval'd at [facility] in ER, had neg workup and was d/c about midnight 8/18/21  He had repeat episode on drive, which this time included numbness down face into neck, and felt like he was having dyspnea, which was new  He describes it as he feels like he has to stop himself from completely exhaling  Inhalation is not an issue  No CP or cough or fever  He is not COVID vaccinated  No GI/ sx  No difficulty with ambulation, no weakness  No seizure history  No travel recently out of country  Mild diarrheal illness a few weeks ago that he attributed to bad food  Had a few bee stings a week ago that resulted in hives that resolved with benadryl, and no anaphylaxis        History provided by:  Patient, medical records and EMS personnel   used: No    Shortness of Breath  Severity:  Unable to specify  Onset quality:  Gradual  Timing:  Intermittent  Progression:  Waxing and waning  Chronicity:  Recurrent  Relieved by:  Nothing  Worsened by: Nothing  Ineffective treatments:  None tried  Associated symptoms: no abdominal pain, no chest pain, no cough, no diaphoresis, no ear pain, no fever, no headaches, no neck pain, no rash, no sore throat and no vomiting    Risk factors: prolonged immobilization    Risk factors: no hx of PE/DVT        None       No past medical history on file  No past surgical history on file  No family history on file  I have reviewed and agree with the history as documented  No existing history information found  No existing history information found  Social History     Tobacco Use    Smoking status: Not on file   Substance Use Topics    Alcohol use: Not on file    Drug use: Not on file       Review of Systems   Constitutional: Negative for chills, diaphoresis, fatigue, fever and unexpected weight change  HENT: Negative for congestion, ear pain, rhinorrhea, sore throat, trouble swallowing and voice change  Eyes: Negative for pain and visual disturbance  Respiratory: Positive for chest tightness and shortness of breath  Negative for cough  Cardiovascular: Negative for chest pain, palpitations and leg swelling  Gastrointestinal: Negative for abdominal pain, blood in stool, constipation, diarrhea, nausea and vomiting  Genitourinary: Negative for difficulty urinating and hematuria  Musculoskeletal: Negative for arthralgias, back pain and neck pain  Skin: Negative for rash  Neurological: Positive for numbness  Negative for dizziness, syncope, light-headedness and headaches  Psychiatric/Behavioral: Negative for confusion and suicidal ideas  The patient is not nervous/anxious  Physical Exam  Physical Exam  Vitals and nursing note reviewed  Constitutional:       General: He is not in acute distress  Appearance: He is well-developed  He is not ill-appearing or diaphoretic  HENT:      Head: Normocephalic and atraumatic        Right Ear: External ear normal       Left Ear: External ear normal  Nose: Nose normal    Eyes:      General: Lids are normal  No visual field deficit or scleral icterus  Right eye: No discharge  Left eye: No discharge  Extraocular Movements: Extraocular movements intact  Right eye: No nystagmus  Left eye: No nystagmus  Conjunctiva/sclera: Conjunctivae normal       Pupils: Pupils are equal, round, and reactive to light  Neck:      Vascular: No JVD  Trachea: No tracheal deviation  Cardiovascular:      Rate and Rhythm: Normal rate and regular rhythm  Heart sounds: Normal heart sounds  No murmur heard  No friction rub  No gallop  Pulmonary:      Effort: Pulmonary effort is normal  No tachypnea, accessory muscle usage or respiratory distress  Breath sounds: Normal breath sounds  No stridor  No wheezing or rales  Chest:      Chest wall: No tenderness  Abdominal:      General: Bowel sounds are normal  There is no distension  Palpations: Abdomen is soft  Tenderness: There is no abdominal tenderness  There is no guarding or rebound  Musculoskeletal:         General: No tenderness or deformity  Normal range of motion  Cervical back: Normal range of motion and neck supple  Right lower leg: No tenderness  No edema  Left lower leg: No tenderness  No edema  Lymphadenopathy:      Cervical: No cervical adenopathy  Skin:     General: Skin is warm and dry  Capillary Refill: Capillary refill takes less than 2 seconds  Findings: No rash  Neurological:      General: No focal deficit present  Mental Status: He is alert and oriented to person, place, and time  GCS: GCS eye subscore is 4  GCS verbal subscore is 5  GCS motor subscore is 6  Cranial Nerves: Cranial nerves are intact  No cranial nerve deficit, dysarthria or facial asymmetry  Sensory: Sensation is intact  No sensory deficit  Motor: Motor function is intact  No weakness        Coordination: Coordination is intact  Coordination normal       Deep Tendon Reflexes:      Reflex Scores:       Patellar reflexes are 2+ on the right side and 3+ on the left side  Psychiatric:         Behavior: Behavior normal          Vital Signs  ED Triage Vitals   Temperature Pulse Respirations Blood Pressure SpO2   08/18/21 0305 08/18/21 0238 08/18/21 0238 08/18/21 0238 08/18/21 0238   97 8 °F (36 6 °C) 78 17 136/84 96 %      Temp src Heart Rate Source Patient Position - Orthostatic VS BP Location FiO2 (%)   -- -- -- -- --             Pain Score       --                  Vitals:    08/18/21 0400 08/18/21 0530 08/18/21 0600 08/18/21 0630   BP: 119/74 133/75 153/70 137/73   Pulse: 67 65 73 67         Visual Acuity      ED Medications  Medications   albuterol (PROVENTIL HFA,VENTOLIN HFA) inhaler 2 puff (2 puffs Inhalation Given 8/18/21 0508)       Diagnostic Studies  Results Reviewed     Procedure Component Value Units Date/Time    Rapid drug screen, urine [[id number]] Collected: 08/18/21 0705    Lab Status: In process Specimen: Urine, Clean Catch Updated: 08/18/21 0710    D-dimer, quantitative [[id number]]  (Normal) Collected: 08/18/21 0516    Lab Status: Final result Specimen: Blood from Arm, Left Updated: 08/18/21 0546     D-Dimer, Quant 0 30 ug/ml FEU     Magnesium [[id number]]  (Normal) Collected: 08/18/21 0249    Lab Status: Final result Specimen: Blood from Arm, Left Updated: 08/18/21 0446     Magnesium 1 9 mg/dL     Phosphorus [[id number]]  (Abnormal) Collected: 08/18/21 0249    Lab Status: Final result Specimen: Blood from Arm, Left Updated: 08/18/21 0446     Phosphorus 2 4 mg/dL     Salicylate level [[id number]]  (Abnormal) Collected: 08/18/21 0249    Lab Status: Final result Specimen: Blood from Arm, Left Updated: 74/15/19 0930     Salicylate Lvl <3 9 mg/dL     Acetaminophen level-If concentration is detectable, please discuss with medical  on call   [[id number]]  (Abnormal) Collected: 08/18/21 0249    Lab Status: Final result Specimen: Blood from Arm, Left Updated: 08/18/21 0446     Acetaminophen Level <2 0 ug/mL     CKMB [[id number]]  (Normal) Collected: 08/18/21 0249    Lab Status: Final result Specimen: Blood from Arm, Left Updated: 08/18/21 0446     CK-MB Index <1 0 %      CK-MB 0 7 ng/mL     CK Total with Reflex CKMB [[id number]]  (Normal) Collected: 08/18/21 0249    Lab Status: Final result Specimen: Blood from Arm, Left Updated: 08/18/21 0358     Total  U/L     Ethanol [[id number]]  (Normal) Collected: 08/18/21 0249    Lab Status: Final result Specimen: Blood from Arm, Left Updated: 08/18/21 0341     Ethanol Lvl <3 mg/dL     Comprehensive metabolic panel [[id number]]  (Abnormal) Collected: 08/18/21 0249    Lab Status: Final result Specimen: Blood from Arm, Left Updated: 08/18/21 0339     Sodium 140 mmol/L      Potassium 3 9 mmol/L      Chloride 104 mmol/L      CO2 22 mmol/L      ANION GAP 14 mmol/L      BUN 14 mg/dL      Creatinine 1 06 mg/dL      Glucose 101 mg/dL      Calcium 9 4 mg/dL      AST 17 U/L      ALT 35 U/L      Alkaline Phosphatase 70 U/L      Total Protein 6 9 g/dL      Albumin 4 1 g/dL      Total Bilirubin 0 80 mg/dL      eGFR 96 ml/min/1 73sq m     Narrative:      [place] guidelines for Chronic Kidney Disease (CKD):     Stage 1 with normal or high GFR (GFR > 90 mL/min/1 73 square meters)    Stage 2 Mild CKD (GFR = 60-89 mL/min/1 73 square meters)    Stage 3A Moderate CKD (GFR = 45-59 mL/min/1 73 square meters)    Stage 3B Moderate CKD (GFR = 30-44 mL/min/1 73 square meters)    Stage 4 Severe CKD (GFR = 15-29 mL/min/1 73 square meters)    Stage 5 End Stage CKD (GFR <15 mL/min/1 73 square meters)  Note: GFR calculation is accurate only with a steady state creatinine    Troponin I [[id number]]  (Normal) Collected: 08/18/21 0249    Lab Status: Final result Specimen: Blood from Arm, Left Updated: 08/18/21 0318     Troponin I <0 02 ng/mL     Sedimentation rate, automated [[id number]] (Normal) Collected: 08/18/21 0249    Lab Status: Final result Specimen: Blood from Arm, Left Updated: 08/18/21 0303     Sed Rate <1 mm/hour     CBC and differential [[id number]]  (Abnormal) Collected: 08/18/21 0249    Lab Status: Final result Specimen: Blood from Arm, Left Updated: 08/18/21 0300     WBC 9 45 Thousand/uL      RBC 4 79 Million/uL      Hemoglobin 15 5 g/dL      Hematocrit 43 8 %      MCV 91 fL      MCH 32 4 pg      MCHC 35 4 g/dL      RDW 11 1 %      MPV 9 7 fL      Platelets 406 Thousands/uL      nRBC 0 /100 WBCs      Neutrophils Relative 66 %      Immat GRANS % 0 %      Lymphocytes Relative 22 %      Monocytes Relative 10 %      Eosinophils Relative 1 %      Basophils Relative 1 %      Neutrophils Absolute 6 23 Thousands/µL      Immature Grans Absolute 0 03 Thousand/uL      Lymphocytes Absolute 2 04 Thousands/µL      Monocytes Absolute 0 96 Thousand/µL      Eosinophils Absolute 0 13 Thousand/µL      Basophils Absolute 0 06 Thousands/µL                  XR chest 1 view portable   ED Interpretation by Michael Rai MD (08/18 8114)   No acute abnormality                 Procedures  ECG 12 Lead Documentation Only    Date/Time: 8/18/2021 2:43 AM  Performed by: Michael Rai MD  Authorized by: Michael Rai MD     Indications / Diagnosis:  Sob  ECG reviewed by me, the ED Provider: yes    Patient location:  ED  Previous ECG:     Previous ECG:  Unavailable    Comparison to cardiac monitor: Yes    Interpretation:     Interpretation: normal    Rate:     ECG rate:  71    ECG rate assessment: normal    Rhythm:     Rhythm: sinus rhythm    Ectopy:     Ectopy: none    QRS:     QRS axis:  Normal    QRS intervals:  Normal  Conduction:     Conduction: normal    ST segments:     ST segments:  Normal  T waves:     T waves: normal    Other findings:     Other findings: LVH               ED Course  ED Course as of Aug 18 0718   Wed Aug 18, 2021   0324 Medical release sent to [facility] in [place] 3834 Review of records from [facility]: was admitted for treatment of presumed tetanus  Had generalized body stiffening, diaphoresis, throat tightness  Was treated with tetanus, Ig, flagyl  Cardiology was consulted, had normal ECHO  Has a zio patch as well  Cbc, cmp, lyme, covid were all neg/unremarkable  0401 Respiratory came down to eval, had normal NIF, peak pressure, and max inspiratory pressure  5118 Dimer neg  No improvement after albuterol  Will discuss with med for possible observation and neuro eval  Pt is agreeable to stay  1367 I had discussed with medicine for potential observation, Zebedee Ask requested I discuss with neurology first  I have sent 3 tiger text messages to Dr Annamaria Finn  Awaiting response  0295 Discussed w/ Dr Annamaria Finn  Agrees with obs  Recommends q4h respiratory checks with vital capacity and NIF, and keep on pulse ox  Neuro will see today  Will discuss with medicine  PERC Rule for PE      Most Recent Value   PERC Rule for PE   Age >=50  0 Filed at: 08/18/2021 0418   HR >=100  0 Filed at: 08/18/2021 0418   O2 Sat on room air < 95%  0 Filed at: 08/18/2021 0418   History of PE or DVT  0 Filed at: 08/18/2021 0418   Recent trauma or surgery  0 Filed at: 08/18/2021 0418   Hemoptysis  0 Filed at: 08/18/2021 0418   Exogenous estrogen  0 Filed at: 08/18/2021 0418   Unilateral leg swelling  0 Filed at: 08/18/2021 0418   PERC Rule for PE Results  0 Filed at: 08/18/2021 0418              SBIRT 22yo+      Most Recent Value   SBIRT (25 yo +)   In order to provide better care to our patients, we are screening all of our patients for alcohol and drug use  Would it be okay to ask you these screening questions? Yes Filed at: 08/18/2021 0549   Initial Alcohol Screen: US AUDIT-C    1  How often do you have a drink containing alcohol? 3 Filed at: 08/18/2021 0549   2  How many drinks containing alcohol do you have on a typical day you are drinking?    1 Filed at: 08/18/2021 0549   3a  Male UNDER 65: How often do you have five or more drinks on one occasion? 0 Filed at: 08/18/2021 0549   3b  FEMALE Any Age, or MALE 65+: How often do you have 4 or more drinks on one occassion? 0 Filed at: 08/18/2021 0549   Audit-C Score  4 Filed at: 08/18/2021 6878   SARAH: How many times in the past year have you    Used an illegal drug or used a prescription medication for non-medical reasons? Never Filed at: 08/18/2021 0549                    [facility]  Number of Diagnoses or Management Options  Dyspnea: new and requires workup  Paresthesias: new and requires workup     Amount and/or Complexity of Data Reviewed  Clinical lab tests: ordered and reviewed  Tests in the radiology section of CPT®: ordered and reviewed  Tests in the medicine section of CPT®: ordered and reviewed  Decide to obtain previous medical records or to obtain history from someone other than the patient: yes  Obtain history from someone other than the patient: yes  Review and summarize past medical records: yes  Discuss the patient with other providers: yes  Independent visualization of images, tracings, or specimens: yes    Risk of Complications, Morbidity, and/or Mortality  Presenting problems: moderate  Diagnostic procedures: low  Management options: moderate    Patient Progress  Patient progress: stable      Disposition  Final diagnoses:   Dyspnea   Paresthesias     Time reflects when diagnosis was documented in both MDM as applicable and the Disposition within this note     Time User Action Codes Description Comment    8/18/2021  7:04 AM Aminta MCDOWELL Add [R06 00] Dyspnea     8/18/2021  7:04 AM Bryant Lord Add [R20 2] Paresthesias       ED Disposition     ED Disposition Condition Date/Time Comment    Admit Stable Wed Aug 18, 2021  7:04 AM Case was discussed with Dr Cassandra Kwon and the patient's admission status was agreed to be Admission Status: observation status to the service of Dr Cassandra Kwon   Follow-up Information    None         Patient's Medications    No medications on file     No discharge procedures on file      PDMP Review     None          ED Provider  Electronically Signed by           Radha Tolentino MD  08/18/21 [address], [place]  08/18/21 0757

## 2021-08-18 NOTE — ASSESSMENT & PLAN NOTE
31 y o  RHD  male who presents to [facility] on 8/18/21 due to episode of dyspnea and L sided chest "warmth" that started early this morning when pt was trying to fall asleep  Pt reports he started having issues a few weeks ago after being stung by a bee  Pt started breaking out in hives, so he took PO Benadryl  Then, about 2 weeks ago, pt was again stung by bees, so he again took PO Benadryl  Since then, pt has periodically been experiencing small areas of hives for which he will take PO Benadryl  Last Saturday, pt was driving to [place] from a [employer] party, when he experienced numbness and tingling in his hands, feet, and face (radiating into R side of neck)  Then, pt developed full body contractions which involved his torso, back, and extremities, so severe that when he was turning into the ED in [place], he had to turn the steering wheel of his care with his face and shoulders  Pt denies any body shaking, LOC, or bladder/bowl incontinence during this episode  Pt was admitted to a hospital in [place] from 8/14/21-8/17/21 and was diagnosed with Tetanus (pt was not up to date on Tdap and had a cut on his L hand)  He was treated with human tetanus immunoglobulin and was vaccinated for Tdap during this hospitalization  Pt then began driving up to [place] from [place], and about 2 hours into car ride, pt developed numbness and tingling in his hands, feet, and face again  After numbness and tingling developed, pt broke out in an erythematous rash in at least his LUE  Pt presented to [facility] at that time for further evaluation  By the time he was evaluated in the ED, his symptoms had resolved  Drug screen, CBC, CK, and routine chemistry were unremarkable, so pt was discharged from the ED  Pt then arrived at his sister's home after discharge  Pt reports when he was trying to sleep in the early morning hours, he was able to take a deep breath in, but he was having difficulty exhaling   He reports that every time he would start to drift into sleep, he would wake himself up because he wasn't remembering to exhale in sleep and that would jerk himself awake  This sensation was associated with L chest "warmth " Pt states he may have had some b/l hand paresthesias at that time but isn't entirely sure  Pt [facility] for further evaluation  Today, pt is feeling well overall  Pt feels magnesium has helped resolve his muscle twitching and has decreased the sensation of his heart pumping "very hard" when trying to fall asleep     On exam 8/19/21: Mental status intact, cranial nerves 2-12 grossly intact, strength 5/5 throughout all 4 extremities, sensation intact to pinprick x 4 extremities, gait normal, finger to nose intact, reflexes present     Symptoms may be attributed to tetanus versus dystonia     Workup  - Labs 8/19/21  - CMP: sodium 141, potassium 4 2, BUN 10, glucose 90, creatinine 1 04, calcium 8 4, AST 15, ALT 25, alk phos 70, total protein 6 5, albumin 3 6, total bilirubin 0 5  - CBC: hemoglobin 14 8, hematocrit 43 3, WBC 8 97, platelets 333  - phosphorus 4 4  - Magnesium 2 1  Plan  - magnesium 1 g BID x 2 doses completed  - Benadryl 25 mg IV Q8H x 3 doses completed   - recommend discharging patient on magnesium oxide PO x 7 days   - Medical management and supportive care per primary team   Correction of any metabolic or infectious disturbances   - No further inpatient neurological recommendations  Please call with any questions or concerns

## 2021-08-18 NOTE — ASSESSMENT & PLAN NOTE
· While in the car had numbness of the face/neck with sudden shortness of breath  · Describes difficulty with exhaling not inhaling  · Denies prior history of anxiety  · Has resolved at this time in the ED  · Chest x-ray is unremarkable  · D-dimer negative  · 98% on room air  · NIF and vital capacity Q shift  · Outpatient sleep study and pulmonary follow-up

## 2021-08-19 VITALS
WEIGHT: 160.72 LBS | RESPIRATION RATE: 18 BRPM | DIASTOLIC BLOOD PRESSURE: 61 MMHG | SYSTOLIC BLOOD PRESSURE: 124 MMHG | OXYGEN SATURATION: 99 % | TEMPERATURE: 98.2 F | HEIGHT: 67 IN | HEART RATE: 62 BPM | BODY MASS INDEX: 25.22 KG/M2

## 2021-08-19 LAB
ALBUMIN SERPL BCP-MCNC: 3.6 G/DL (ref 3.5–5)
ALP SERPL-CCNC: 70 U/L (ref 46–116)
ALT SERPL W P-5'-P-CCNC: 25 U/L (ref 12–78)
ANION GAP SERPL CALCULATED.3IONS-SCNC: 8 MMOL/L (ref 4–13)
AST SERPL W P-5'-P-CCNC: 15 U/L (ref 5–45)
BASOPHILS # BLD AUTO: 0.07 THOUSANDS/ΜL (ref 0–0.1)
BASOPHILS NFR BLD AUTO: 1 % (ref 0–1)
BILIRUB SERPL-MCNC: 0.5 MG/DL (ref 0.2–1)
BUN SERPL-MCNC: 10 MG/DL (ref 5–25)
CALCIUM SERPL-MCNC: 8.4 MG/DL (ref 8.3–10.1)
CHLORIDE SERPL-SCNC: 106 MMOL/L (ref 100–108)
CO2 SERPL-SCNC: 27 MMOL/L (ref 21–32)
CREAT SERPL-MCNC: 1.04 MG/DL (ref 0.6–1.3)
EOSINOPHIL # BLD AUTO: 0.29 THOUSAND/ΜL (ref 0–0.61)
EOSINOPHIL NFR BLD AUTO: 3 % (ref 0–6)
ERYTHROCYTE [DISTWIDTH] IN BLOOD BY AUTOMATED COUNT: 11.5 % (ref 11.6–15.1)
GFR SERPL CREATININE-BSD FRML MDRD: 98 ML/MIN/1.73SQ M
GLUCOSE SERPL-MCNC: 90 MG/DL (ref 65–140)
HCT VFR BLD AUTO: 43.3 % (ref 36.5–49.3)
HGB BLD-MCNC: 14.8 G/DL (ref 12–17)
IMM GRANULOCYTES # BLD AUTO: 0.05 THOUSAND/UL (ref 0–0.2)
IMM GRANULOCYTES NFR BLD AUTO: 1 % (ref 0–2)
LYMPHOCYTES # BLD AUTO: 2.18 THOUSANDS/ΜL (ref 0.6–4.47)
LYMPHOCYTES NFR BLD AUTO: 24 % (ref 14–44)
MAGNESIUM SERPL-MCNC: 2.1 MG/DL (ref 1.6–2.6)
MCH RBC QN AUTO: 32.8 PG (ref 26.8–34.3)
MCHC RBC AUTO-ENTMCNC: 34.2 G/DL (ref 31.4–37.4)
MCV RBC AUTO: 96 FL (ref 82–98)
MONOCYTES # BLD AUTO: 1.09 THOUSAND/ΜL (ref 0.17–1.22)
MONOCYTES NFR BLD AUTO: 12 % (ref 4–12)
NEUTROPHILS # BLD AUTO: 5.29 THOUSANDS/ΜL (ref 1.85–7.62)
NEUTS SEG NFR BLD AUTO: 59 % (ref 43–75)
NRBC BLD AUTO-RTO: 0 /100 WBCS
PHOSPHATE SERPL-MCNC: 4.4 MG/DL (ref 2.7–4.5)
PLATELET # BLD AUTO: 225 THOUSANDS/UL (ref 149–390)
PMV BLD AUTO: 9.7 FL (ref 8.9–12.7)
POTASSIUM SERPL-SCNC: 4.2 MMOL/L (ref 3.5–5.3)
PROT SERPL-MCNC: 6.5 G/DL (ref 6.4–8.2)
RBC # BLD AUTO: 4.51 MILLION/UL (ref 3.88–5.62)
SODIUM SERPL-SCNC: 141 MMOL/L (ref 136–145)
WBC # BLD AUTO: 8.97 THOUSAND/UL (ref 4.31–10.16)

## 2021-08-19 PROCEDURE — 99213 OFFICE O/P EST LOW 20 MIN: CPT | Performed by: PHYSICIAN ASSISTANT

## 2021-08-19 PROCEDURE — 83735 ASSAY OF MAGNESIUM: CPT | Performed by: INTERNAL MEDICINE

## 2021-08-19 PROCEDURE — 85025 COMPLETE CBC W/AUTO DIFF WBC: CPT | Performed by: INTERNAL MEDICINE

## 2021-08-19 PROCEDURE — 99217 PR OBSERVATION CARE DISCHARGE MANAGEMENT: CPT | Performed by: INTERNAL MEDICINE

## 2021-08-19 PROCEDURE — 84100 ASSAY OF PHOSPHORUS: CPT | Performed by: INTERNAL MEDICINE

## 2021-08-19 PROCEDURE — 80053 COMPREHEN METABOLIC PANEL: CPT | Performed by: INTERNAL MEDICINE

## 2021-08-19 RX ORDER — NICOTINE 21 MG/24HR
14 PATCH, TRANSDERMAL 24 HOURS TRANSDERMAL DAILY
Status: DISCONTINUED | OUTPATIENT
Start: 2021-08-19 | End: 2021-08-19 | Stop reason: HOSPADM

## 2021-08-19 RX ORDER — NICOTINE 21 MG/24HR
1 PATCH, TRANSDERMAL 24 HOURS TRANSDERMAL DAILY
Qty: 28 PATCH | Refills: 0 | Status: SHIPPED | OUTPATIENT
Start: 2021-08-19

## 2021-08-19 RX ADMIN — DIPHENHYDRAMINE HYDROCHLORIDE 25 MG: 50 INJECTION, SOLUTION INTRAMUSCULAR; INTRAVENOUS at 05:17

## 2021-08-19 RX ADMIN — METRONIDAZOLE 500 MG: 500 TABLET ORAL at 05:17

## 2021-08-19 NOTE — UTILIZATION REVIEW
Initial Clinical Review    Admission: Date/Time/Statement:   Admission Orders (From admission, onward)     Ordered        08/18/21 0718  Place in Observation  Once                   Orders Placed This Encounter   Procedures    Place in Observation     Standing Status:   Standing     Number of Occurrences:   1     Order Specific Question:   Level of Care     Answer:   Med Surg [16]     ED Arrival Information     Expected Arrival Acuity    - 8/18/2021 02:37 Urgent         Means of arrival Escorted by Service Admission type    Ambulance JON Murray) Hospitalist Urgent         Arrival complaint    Allergic Reaction        Chief Complaint   Patient presents with    Shortness of Breath     Pt c/o of getting osb while talking and feels tingling in his lips and fingers  Initial Presentation: 31year old male, presented to the ED @ [facility], from home via EMS  Admitted as Observation due to SOB  Date: 08/18/2021   Presented to an ER with episode of shortness of breath, tingling in lips and fingers while driving from [place] to [place]  Pt has been evaluated in 2 EDs in the past week during his drive for similar episodes prior to arrival to [facility] ED  Pt was discharged from [address] [place] [address] on 8/18/21 after unremarkable workup in urine toxicology, CBC, CK, and routine chemistry  Patient is driving from [place] to [place] to attend a wedding  Patient during his drive developed an episode of whole body paresthesias and stiffness and went to ER in [place] where he was treated for tetanus  Patient was admitted 8/14-8/17/21 at [facility] where he received tetanus immunoglobulin and vaccine and was discharged on Flagyl, Flexeril and Benadryl p r n     Patient was discharged from [facility] and on his way back again had feeling of tingling of hands, feet and face and patient went to [facility] ER where he had unremarkable workup and was discharged home  Patient went to his sister's house in [address] [place] [address] and states he felt short of breath when he was trying to sleep  He states that he felt like he was not able to exhale completely and came to ER  Patient had negative D-dimer, troponin, UDS is negative in ER  Chest x-ray is unremarkable  Neuro q4h  Consult Neuro  08/18/2021  Consult Neuro:  no past medical history presenting with involuntary muscle spasms, could be dystonia vs tetanus  He was treated with tetanus immunoglobulin, and DPT booster  Continues to have some symptoms intermittently  Will give magnesium and benadryl  No medications at baseline to suspect drug induced dystonic reaction  Agree with obtain NIF's and VC's  No indication for neuroimaging at this point        ED Triage Vitals   Temperature Pulse Respirations Blood Pressure SpO2   08/18/21 0305 08/18/21 0238 08/18/21 0238 08/18/21 0238 08/18/21 0238   97 8 °F (36 6 °C) 78 17 136/84 96 %  RA      Temp Source Heart Rate Source Patient Position - Orthostatic VS BP Location FiO2 (%)   08/18/21 0756 08/18/21 2112 08/18/21 0756 08/18/21 0756 --   Oral Monitor Lying Left arm       Pain Score       08/18/21 0900       No Pain          Wt Readings from Last 1 Encounters:   08/19/21 72 9 kg (160 lb 11 5 oz)     Additional Vital Signs:   Date/Time  Temp  Pulse  Resp  BP  MAP (mmHg)  SpO2  O2 Device  Patient Position - Orthostatic VS   08/19/21 0730  --  --  --  --  --  --  None (Room air)  --   08/19/21 0721  98 2 °F (36 8 °C)  62  18  124/61  85  99 %  None (Room air)  Lying   08/18/21 2112  98 5 °F (36 9 °C)  74  20  131/77  98  98 %  None (Room air)  Lying   08/18/21 1412  98 5 °F (36 9 °C)  87  17  117/71  90  98 %  None (Room air)  Lying   08/18/21 0900  --  --  --  --  --  --  None (Room air)  --   08/18/21 0756  98 1 °F (36 7 °C)  79  16  130/82  100  97 %  None (Room air)  Lying   08/18/21 0630  --  67  15  137/73  100  96 %  --  --   08/18/21 0600  --  73  16  153/70 101  97 %  --  --   21 0530  --  65  12  133/75  98  98 %  --  --   21 0400  --  67  14  119/74  90  98 %  --  --   21 0324  --  67  15  --  --  97 %  None (Room air)  --   21 0305  97 8 °F (36 6 °C)  --  --  --  --  --  --  --   21 0300  --  66  12  135/75  99  99 %  --  --     Date and Time Eye Opening Best Verbal Response Best Motor Response Allen Coma Scale Score   21 0730 4 5 6 15   21 0215 4 5 6 15   21 2000 4 5 6 15   21 1700 4 5 6 15   21 1300 4 5 6 15   21 0900 4 5 6 15   21 0550 4 5 6 15     2021 @ 0932  Chest X:  No acute cardiopulmonary disease       2021 @ 0705  EC, NSR    Pertinent Labs/Diagnostic Test Results:     Results from last 7 days   Lab Units 21  0520 21  0249   WBC Thousand/uL 8 97 9 45   HEMOGLOBIN g/dL 14 8 15 5   HEMATOCRIT % 43 3 43 8   PLATELETS Thousands/uL 225 249   NEUTROS ABS Thousands/µL 5 29 6 23     Results from last 7 days   Lab Units 21  0520 21  0249   SODIUM mmol/L 141 140   POTASSIUM mmol/L 4 2 3 9   CHLORIDE mmol/L 106 104   CO2 mmol/L 27 22   ANION GAP mmol/L 8 14*   BUN mg/dL 10 14   CREATININE mg/dL 1 04 1 06   EGFR ml/min/1 73sq m 98 96   CALCIUM mg/dL 8 4 9 4   MAGNESIUM mg/dL 2 1 1 9   PHOSPHORUS mg/dL 4 4 2 4*     Results from last 7 days   Lab Units 21  0520 21  0249   AST U/L 15 17   ALT U/L 25 35   ALK PHOS U/L 70 70   TOTAL PROTEIN g/dL 6 5 6 9   ALBUMIN g/dL 3 6 4 1   TOTAL BILIRUBIN mg/dL 0 50 0 80     Results from last 7 days   Lab Units 21  0520 21  0249   GLUCOSE RANDOM mg/dL 90 101     Results from last 7 days   Lab Units 21  0249   CK TOTAL U/L 164   CK MB INDEX % <1 0   CK MB ng/mL 0 7     Results from last 7 days   Lab Units 21  0249   TROPONIN I ng/mL <0 02     Results from last 7 days   Lab Units 21  0516   D-DIMER QUANTITATIVE ug/ml FEU 0 30     Results from last 7 days   Lab Units 21  0249 SED RATE mm/hour <1     Results from last 7 days   Lab Units 08/18/21  0705   AMPH/METH  Negative   BARBITURATE UR  Negative   BENZODIAZEPINE UR  Negative   COCAINE UR  Negative   METHADONE URINE  Negative   OPIATE UR  Negative   PCP UR  Negative   THC UR  Negative     Results from last 7 days   Lab Units 08/18/21  0249   ETHANOL LVL mg/dL <3   ACETAMINOPHEN LVL ug/mL <3 7*   SALICYLATE LVL mg/dL <8 8*     ED Treatment:   Medication Administration from 08/18/2021 0237 to 08/18/2021 0752       Date/Time Order Dose Route Action     08/18/2021 0508 albuterol (PROVENTIL HFA,VENTOLIN HFA) inhaler 2 puff 2 puff Inhalation Given        History reviewed  No pertinent past medical history  Present on Admission:  **None**      Admitting Diagnosis: Shortness of breath [R06 02]  Paresthesias [R20 2]  Dyspnea [R06 00]  Age/Sex: 31 y o  male  Admission Orders:  Scheduled Medications:  metroNIDAZOLE, 500 mg, Oral, Q8H [address]      Continuous IV Infusions:  sodium chloride, 75 mL/hr, Intravenous, Continuous      PRN Meds:  acetaminophen, 650 mg, Oral, Q6H PRN  cyclobenzaprine, 5 mg, Oral, TID PRN  ondansetron, 4 mg, Intravenous, Q6H PRN      Neuro checks q4h  Consult PT  IP CONSULT TO NEUROLOGY  IP CONSULT TO CASE MANAGEMENT    Network Utilization Review Department  ATTENTION: Please call with any questions or concerns to [phone] and carefully listen to the prompts so that you are directed to the right person  All voicemails are confidential   Viviana Andrews all requests for admission clinical reviews, approved or denied determinations and any other requests to dedicated fax number below belonging to the campus where the patient is receiving treatment   List of dedicated fax numbers for the Facilities:  [address] DENIALS (Administrative/Medical Necessity) [phone]   [address] (Maternity/NICU/Pediatrics) [phone]   5000 [facility] [place] [phone]   8049 [facility] [phone]   [place] [facility] [address] [place] [address] [address] [place] [address] [phone] 4601 [facility] [phone]

## 2021-08-19 NOTE — DISCHARGE INSTR - AVS FIRST PAGE
Follow-up with PCP and Pulmonary as outpatient  Return to ER with any worsening weakness of extremities, shortness of breath, numbness, paresthesia, slurred speech or any other alarming symptoms

## 2021-08-19 NOTE — ASSESSMENT & PLAN NOTE
· While in the car had numbness of the face/neck with sudden shortness of breath  · Describes difficulty with exhaling not inhaling  · Denies prior history of anxiety  · Has resolved at this time in the ED  · Chest x-ray is unremarkable  · D-dimer negative  · 98% on room air  · NIF and vital capacity Q shift is normal  · Shortness of breath has resolved  · Outpatient sleep study and pulmonary follow-up

## 2021-08-19 NOTE — PLAN OF CARE
Problem: INFECTION - ADULT  Goal: Absence or prevention of progression during hospitalization  Description: INTERVENTIONS:  - Assess and monitor for signs and symptoms of infection  - Monitor lab/diagnostic results  - Monitor all insertion sites, i e  indwelling lines, tubes, and drains  - Monitor endotracheal if appropriate and nasal secretions for changes in amount and color  - [place] appropriate cooling/warming therapies per order  - Administer medications as ordered  - Instruct and encourage patient and family to use good hand hygiene technique  - Identify and instruct in appropriate isolation precautions for identified infection/condition  Outcome: Progressing  Goal: Absence of fever/infection during neutropenic period  Description: INTERVENTIONS:  - Monitor WBC    Outcome: Progressing     Problem: DISCHARGE PLANNING  Goal: Discharge to home or other facility with appropriate resources  Description: INTERVENTIONS:  - Identify barriers to discharge w/patient and caregiver  - Arrange for needed discharge resources and transportation as appropriate  - Identify discharge learning needs (meds, wound care, etc )  - Arrange for interpretive services to assist at discharge as needed  - Refer to Case Management Department for coordinating discharge planning if the patient needs post-hospital services based on physician/advanced practitioner order or complex needs related to functional status, cognitive ability, or social support system  Outcome: Progressing     Problem: PAIN - ADULT  Goal: Verbalizes/displays adequate comfort level or baseline comfort level  Description: Interventions:  - Encourage patient to monitor pain and request assistance  - Assess pain using appropriate pain scale  - Administer analgesics based on type and severity of pain and evaluate response  - Implement non-pharmacological measures as appropriate and evaluate response  - Consider cultural and social influences on pain and pain management  - Notify physician/advanced practitioner if interventions unsuccessful or patient reports new pain  Outcome: Progressing

## 2021-08-19 NOTE — DISCHARGE SUMMARY
[facility]  Discharge- Jayro Peñaloza 1995, 31 y o  male MRN: [id number]  Unit/Bed#: -Jo [facility] Encounter: [id number]  Primary Care Provider: No primary care provider on file  Date and time admitted to hospital: 8/18/2021  2:37 AM    * SOB (shortness of breath)  Assessment & Plan  · While in the car had numbness of the face/neck with sudden shortness of breath  · Describes difficulty with exhaling not inhaling  · Denies prior history of anxiety  · Has resolved at this time in the ED  · Chest x-ray is unremarkable  · D-dimer negative  · 98% on room air  · NIF and vital capacity Q shift is normal  · Shortness of breath has resolved  · Outpatient sleep study and pulmonary follow-up    Paresthesias  Assessment & Plan  · Prior to ED arrival had tingling in hands, feet and face  Resolved now  Has had multiple episodes of this over the last week  · Had been admitted in [place] with suspected tetanus  Seen at [facility] yesterday in the ER and discharged  · Neuro checks q 4 hours  · Neurology consult appreciated  · Patient received magnesium 1 g b i d  X2 doses and her his symptoms have completely resolved  · Neurology recommends patient to be discharged on magnesium oxide supplementation by mouth for 7 days  · No further inpatient neurologic recommendation and patient is stable for discharge  · Patient is in agreement with the discharge plan    Tetanus  Assessment & Plan  · Had been treated at hospital in [place] for presumed tetanus from 08/15 through 8/17  · Symptoms:  2 episodes where his entire body became stiff/tight, tingling in hands/feet  · Denies LOC, seizure, shortness of breath at that time  · Received tetanus shot, IVIG and started on p o   Flagyl      Hospital Course:     Jayro Peñaloza is a 31 y o  male patient who originally presented to the hospital on   Admission Orders (From admission, onward)     Ordered        08/18/21 0718  Place in Observation  Once due to episode of shortness of breath, tingling in lips and fingers while driving from [place] to [place]  Pt has been evaluated in 2 EDs in the past week during his drive for similar episodes prior to arrival to [facility] [place] [facility]  Pt was discharged from [address] [place] [address] on 8/18/21 after unremarkable workup in urine toxicology, CBC, CK, and routine chemistry  Patient is driving from [place] to [place] to attend a wedding  Patient during his drive developed an episode of whole body paresthesias and stiffness and went to ER in [place] where he was treated for tetanus  Patient was admitted 8/14-8/17/21 at [facility] where he received tetanus immunoglobulin and vaccine and was discharged on Flagyl, Flexeril and Benadryl p r n  Patient was discharged from [facility] and on his way back again had feeling of tingling of hands, feet and face and patient went to [facility] ER where he had unremarkable workup and was discharged home  Patient went to his sister's house in [facility] and states he felt short of breath when he was trying to sleep  He states that he felt like he was not able to exhale completely and came to ER  Patient had negative D-dimer, troponin, UDS is negative in ER and ER provider spoke with Neurology who recommended admitting for observation  Patient was admitted and was seen by Neurology  Patient was given magnesium IV x2 doses and Benadryl and continued on Flagyl  Patient respiratory status remained stable and pulse ox remained stable  Patient this morning feels back to baseline and has no complaints  Patient was cleared by Neurology for discharge on oral magnesium and no further inpatient neurologic recommendation  On exam  Chest-clear to auscultation  Heart-S1-S2 regular  Abdomen-soft, nontender  Neuro-alert awake oriented x3  No motor or sensory deficits  Able to wiggle the toes    Please see above list of diagnoses and related plan for additional information  Follow-up with PCP and Pulmonary as outpatient  Return to ER with any worsening weakness of extremities, shortness of breath, numbness, paresthesia, slurred speech or any other alarming symptoms  Condition at Discharge:  good      Discharge instructions/Information to patient and family:   See after visit summary for information provided to patient and family  Provisions for Follow-Up Care:  See after visit summary for information related to follow-up care and any pertinent home health orders  Disposition:     Home       Discharge Statement:  I spent 45 minutes discharging the patient  This time was spent on the day of discharge  I had direct contact with the patient on the day of discharge  Greater than 50% of the total time was spent examining patient, answering all patient questions, arranging and discussing plan of care with patient as well as directly providing post-discharge instructions  Additional time then spent on discharge activities  Discharge Medications:  See after visit summary for reconciled discharge medications provided to patient and family        ** Please Note: This note has been constructed using a voice recognition system **

## 2021-08-19 NOTE — PROGRESS NOTES
Progress Note - Neurology   Edil Guzman 31 y o  male [id number]  Unit/Bed#: /-01    Assessment/Plan:    Paresthesias  Assessment & Plan   31 y o  RHD  male who [facility] on [facility]18[facility]21 due to episode of dyspnea and L sided chest "warmth" that started early this morning when pt was trying to fall asleep  Pt reports he started having issues a few weeks ago after being stung by a bee  Pt started breaking out in hives, so he took PO Benadryl  Then, about 2 weeks ago, pt was again stung by bees, so he again took PO Benadryl  Since then, pt has periodically been experiencing small areas of hives for which he will take PO Benadryl  Last Saturday, pt was driving to [place] from a [employer] party, when he experienced numbness and tingling in his hands, feet, and face (radiating into R side of neck)  Then, pt developed full body contractions which involved his torso, back, and extremities, so severe that when he was turning into the ED in [place], he had to turn the steering wheel of his care with his face and shoulders  Pt denies any body shaking, LOC, or bladder/bowl incontinence during this episode  Pt was admitted to a hospital in [place] from 8/14/21-8/17/21 and was diagnosed with Tetanus (pt was not up to date on Tdap and had a cut on his L hand)  He was treated with human tetanus immunoglobulin and was vaccinated for Tdap during this hospitalization  Pt then began driving up to [place] from [place], and about 2 hours into car ride, pt developed numbness and tingling in his hands, feet, and face again  After numbness and tingling developed, pt broke out in an erythematous rash in at least his LUE  Pt presented to [facility] at that time for further evaluation  By the time he was evaluated in the ED, his symptoms had resolved  Drug screen, CBC, CK, and routine chemistry were unremarkable, so pt was discharged from the ED  Pt then arrived at his sister's home after discharge   Pt reports when he was trying to sleep in the early morning hours, he was able to take a deep breath in, but he was having difficulty exhaling  He reports that every time he would start to drift into sleep, he would wake himself up because he wasn't remembering to exhale in sleep and that would jerk himself awake  This sensation was associated with L chest "warmth " Pt states he may have had some b/l hand paresthesias at that time but isn't entirely sure  Pt [facility] for further evaluation  Today, pt is feeling well overall  Pt feels magnesium has helped resolve his muscle twitching and has decreased the sensation of his heart pumping "very hard" when trying to fall asleep     On exam 8/19/21: Mental status intact, cranial nerves 2-12 grossly intact, strength 5/5 throughout all 4 extremities, sensation intact to pinprick x 4 extremities, gait normal, finger to nose intact, reflexes present     Symptoms may be attributed to tetanus versus dystonia     Workup  - Labs 8/19/21  - CMP: sodium 141, potassium 4 2, BUN 10, glucose 90, creatinine 1 04, calcium 8 4, AST 15, ALT 25, alk phos 70, total protein 6 5, albumin 3 6, total bilirubin 0 5  - CBC: hemoglobin 14 8, hematocrit 43 3, WBC 8 97, platelets 607  - phosphorus 4 4  - Magnesium 2 1  Plan  - magnesium 1 g BID x 2 doses completed  - Benadryl 25 mg IV Q8H x 3 doses completed   - recommend discharging patient on magnesium oxide PO x 7 days   - Medical management and supportive care per primary team   Correction of any metabolic or infectious disturbances   - No further inpatient neurological recommendations  Please call with any questions or concerns  Ramseslena Karri will not need outpatient follow up with Neurology  He will not require outpatient neurological testing  Subjective:   Pt notes doing well overall today   Pt reports last night his heart was "pumping hard, and I had to push the air out" but the IV magnesium helped alleviate these symptoms, and pt was able to sleep last night  Pt slept mostly through the night, woke up a few times due to nurse coming in the room, but he was able to easily fall back to sleep  Pt's RUE twitching stopped yesterday and has not returned  Pt reports twitch in his leg last night but resolved after 1-2 twitches  Pt notes eating well and staying well hydrated  History reviewed  No pertinent past medical history  History reviewed  No pertinent surgical history  History reviewed  No pertinent family history  Social History     Socioeconomic History    Marital status: Single     Spouse name: None    Number of children: None    Years of education: None    Highest education level: None   Occupational History    None   Tobacco Use    Smoking status: Current Some Day Smoker    Smokeless tobacco: Former User   Substance and Sexual Activity    Alcohol use: Not Currently    Drug use: None    Sexual activity: None   Other Topics Concern    None   Social History Narrative    None     Social Determinants of Health     Financial Resource Strain:     Difficulty of Paying Living Expenses:    Food Insecurity:     Worried About Running Out of Food in the Last Year:     Ran Out of Food in the Last Year:    Transportation Needs:     Lack of Transportation (Medical):      Lack of Transportation (Non-Medical):    Physical Activity:     Days of Exercise per Week:     Minutes of Exercise per Session:    Stress:     Feeling of Stress :    Social Connections:     Frequency of Communication with Friends and Family:     Frequency of Social Gatherings with Friends and Family:     Attends Synagogue Services:     Active Member of Clubs or Organizations:     Attends Club or Organization Meetings:     Marital Status:    Intimate Partner Violence:     Fear of Current or Ex-Partner:     Emotionally Abused:     Physically Abused:     Sexually Abused:      E-Cigarette/Vaping E-Cigarette/Vaping Substances         Medications: All current active meds have been reviewed and current meds:  Scheduled Meds:  Current Facility-Administered Medications   Medication Dose Route Frequency Provider Last Rate    acetaminophen  650 mg Oral Q6H PRN Myesha Ram MD      cyclobenzaprine  5 mg Oral TID PRN Myesha Ram MD      metroNIDAZOLE  500 mg Oral Q8H Janusz Douglass MD      ondansetron  4 mg Intravenous Q6H PRN Myesha Ram MD      sodium chloride  75 mL/hr Intravenous Continuous Myesha Ram MD 75 mL/hr (08/18/21 2215)     Continuous Infusions:sodium chloride, 75 mL/hr, Last Rate: 75 mL/hr (08/18/21 2215)      PRN Meds:   acetaminophen    cyclobenzaprine    ondansetron       ROS:   Review of Systems   Constitutional: Negative for appetite change, chills and fever  HENT: Negative for trouble swallowing  Eyes: Negative for visual disturbance  Respiratory: Positive for chest tightness (not currently, last night when attempting to fall asleep )  Negative for cough and shortness of breath  Cardiovascular: Negative for chest pain  Gastrointestinal: Negative for nausea and vomiting  Genitourinary: Negative for difficulty urinating and dysuria  Musculoskeletal: Negative for neck pain and neck stiffness  Neurological: Negative for dizziness, facial asymmetry, speech difficulty, weakness, light-headedness and headaches  Psychiatric/Behavioral: Negative for confusion  Vitals:   /61 (BP Location: Right arm)   Pulse 62   Temp 98 2 °F (36 8 °C) (Oral)   Resp 18   Ht 5' 7" (1 702 m)   Wt 72 9 kg (160 lb 11 5 oz)   SpO2 99%   BMI 25 17 kg/m²     Physical Exam:   Physical Exam  Vitals and nursing note reviewed  Constitutional:       General: He is not in acute distress  Appearance: He is not diaphoretic  HENT:      Head: Normocephalic and atraumatic  Nose: Nose normal  No congestion or rhinorrhea        Mouth/Throat: Mouth: Mucous membranes are moist       Pharynx: Oropharynx is clear  No oropharyngeal exudate or posterior oropharyngeal erythema  Eyes:      General: No scleral icterus  Right eye: No discharge  Left eye: No discharge  Extraocular Movements: Extraocular movements intact and EOM normal       Conjunctiva/sclera: Conjunctivae normal       Pupils: Pupils are equal, round, and reactive to light  Cardiovascular:      Comments: Carlos Travisi in place   Pulmonary:      Effort: Pulmonary effort is normal    Musculoskeletal:         General: Normal range of motion  Neurological:      Mental Status: He is alert and oriented to person, place, and time  Coordination: Finger-Nose-Finger Test and Romberg Test normal       Deep Tendon Reflexes: Strength normal       Reflex Scores:       Tricep reflexes are 2+ on the right side and 2+ on the left side  Bicep reflexes are 2+ on the right side and 2+ on the left side  Brachioradialis reflexes are 2+ on the right side and 2+ on the left side  Patellar reflexes are 3+ on the right side and 3+ on the left side  Psychiatric:         Speech: Speech normal       Comments: Slightly flat affect, cooperative during exam        Neurologic Exam     Mental Status   Oriented to person, place, and time  Attention: normal  Concentration: normal    Speech: speech is normal   Level of consciousness: alert    Cranial Nerves     CN II   Visual acuity: normal  Right visual field deficit: none  Left visual field deficit: none     CN III, IV, VI   Pupils are equal, round, and reactive to light  Extraocular motions are normal    Right pupil: Size: 3 mm  Shape: regular  Reactivity: brisk  Consensual response: intact  Accommodation: intact  Left pupil: Size: 3 mm  Shape: regular  Reactivity: brisk  Consensual response: intact  Accommodation: intact  CN III: no CN III palsy  CN VI: no CN VI palsy  Nystagmus: none     CN V   Facial sensation intact       CN VII   Facial expression full, symmetric  CN VIII   Hearing impaired: grossly intact     CN IX, X   CN IX normal    CN X normal      CN XI   CN XI normal      CN XII   CN XII normal      Motor Exam   Muscle bulk: normal  Overall muscle tone: normal    Strength   Strength 5/5 throughout  Sensory Exam   Pinprick normal      Gait, Coordination, and Reflexes     Gait  Gait: (able to march in place without assistance )    Coordination   Romberg: negative  Finger to nose coordination: normal    Tremor   Resting tremor: absent  Intention tremor: absent    Reflexes   Right brachioradialis: 2+  Left brachioradialis: 2+  Right biceps: 2+  Left biceps: 2+  Right triceps: 2+  Left triceps: 2+  Right patellar: 3+  Left patellar: 3+          Labs: I have personally reviewed pertinent reports     Recent Results (from the past 24 hour(s))   Comprehensive metabolic panel    Collection Time: 08/19/21  5:20 AM   Result Value Ref Range    Sodium 141 136 - 145 mmol/L    Potassium 4 2 3 5 - 5 3 mmol/L    Chloride 106 100 - 108 mmol/L    CO2 27 21 - 32 mmol/L    ANION GAP 8 4 - 13 mmol/L    BUN 10 5 - 25 mg/dL    Creatinine 1 04 0 60 - 1 30 mg/dL    Glucose 90 65 - 140 mg/dL    Calcium 8 4 8 3 - 10 1 mg/dL    AST 15 5 - 45 U/L    ALT 25 12 - 78 U/L    Alkaline Phosphatase 70 46 - 116 U/L    Total Protein 6 5 6 4 - 8 2 g/dL    Albumin 3 6 3 5 - 5 0 g/dL    Total Bilirubin 0 50 0 20 - 1 00 mg/dL    eGFR 98 ml/min/1 73sq m   Magnesium    Collection Time: 08/19/21  5:20 AM   Result Value Ref Range    Magnesium 2 1 1 6 - 2 6 mg/dL   Phosphorus    Collection Time: 08/19/21  5:20 AM   Result Value Ref Range    Phosphorus 4 4 2 7 - 4 5 mg/dL   CBC and differential    Collection Time: 08/19/21  5:20 AM   Result Value Ref Range    WBC 8 97 4 31 - 10 16 Thousand/uL    RBC 4 51 3 88 - 5 62 Million/uL    Hemoglobin 14 8 12 0 - 17 0 g/dL    Hematocrit 43 3 36 5 - 49 3 %    MCV 96 82 - 98 fL    MCH 32 8 26 8 - 34 3 pg    MCHC 34 2 31 4 - 37 4 g/dL RDW 11 5 (L) 11 6 - 15 1 %    MPV 9 7 8 9 - 12 7 fL    Platelets 962 620 - 467 Thousands/uL    nRBC 0 /100 WBCs    Neutrophils Relative 59 43 - 75 %    Immat GRANS % 1 0 - 2 %    Lymphocytes Relative 24 14 - 44 %    Monocytes Relative 12 4 - 12 %    Eosinophils Relative 3 0 - 6 %    Basophils Relative 1 0 - 1 %    Neutrophils Absolute 5 29 1 85 - 7 62 Thousands/µL    Immature Grans Absolute 0 05 0 00 - 0 20 Thousand/uL    Lymphocytes Absolute 2 18 0 60 - 4 47 Thousands/µL    Monocytes Absolute 1 09 0 17 - 1 22 Thousand/µL    Eosinophils Absolute 0 29 0 00 - 0 61 Thousand/µL    Basophils Absolute 0 07 0 00 - 0 10 Thousands/µL       Imaging: I have personally reviewed pertinent imaging in PACS, including CXR 8/18/21,  and I have personally reviewed PACS reports  EKG, Pathology, and Other Studies: I have personally reviewed pertinent reports  EKG 8/18/21       VTE Prophylaxis: ambulation       Counseling / Coordination of Care  Total time spent today 25 minutes  Greater than 50% of total time was spent with the patient and/or family counseling and/or coordination of care  A description of the counseling/coordination of care:  Patient was seen and evaluated  Discussed with attending  Chart reviewed thoroughly including laboratory and imaging studies    Plan of care discussed with patient and primary team

## 2021-08-19 NOTE — PROGRESS NOTES
Pastoral Care Progress Note    2021  Patient: Maycol Urban : 1995  Admission Date & Time: 2021 0237  MRN: [id number] [facility]: [id number]                     Chaplaincy Interventions Utilized:   Relationship Building: Cultivated a relationship of care and support  Patient said that he was visited by his  last night (from when he used to live near here)  He was visiting the area from [address] [place] [address] for a wedding when he came down ill  He will be discharged today      Ritual: Provided prayer     21 1100   Clinical Encounter Type   Visited With Patient   Routine Visit Introduction   Referral To   (census/rounds)   Scientologist Encounters   Scientologist Needs Prayer

## 2021-08-19 NOTE — PLAN OF CARE
Problem: PAIN - ADULT  Goal: Verbalizes/displays adequate comfort level or baseline comfort level  Description: Interventions:  - Encourage patient to monitor pain and request assistance  - Assess pain using appropriate pain scale  - Administer analgesics based on type and severity of pain and evaluate response  - Implement non-pharmacological measures as appropriate and evaluate response  - Consider cultural and social influences on pain and pain management  - Notify physician/advanced practitioner if interventions unsuccessful or patient reports new pain  8/18/2021 2329 by Mary Pruitt RN  Outcome: Progressing  8/18/2021 2329 by Mary Pruitt RN  Outcome: Progressing     Problem: INFECTION - ADULT  Goal: Absence or prevention of progression during hospitalization  Description: INTERVENTIONS:  - Assess and monitor for signs and symptoms of infection  - Monitor lab/diagnostic results  - Monitor all insertion sites, i e  indwelling lines, tubes, and drains  - Monitor endotracheal if appropriate and nasal secretions for changes in amount and color  - [place] appropriate cooling/warming therapies per order  - Administer medications as ordered  - Instruct and encourage patient and family to use good hand hygiene technique  - Identify and instruct in appropriate isolation precautions for identified infection/condition  8/18/2021 2329 by Mary Pruitt RN  Outcome: Progressing  8/18/2021 2329 by Mary Pruitt RN  Outcome: Progressing  Goal: Absence of fever/infection during neutropenic period  Description: INTERVENTIONS:  - Monitor WBC    8/18/2021 2329 by Mary Pruitt RN  Outcome: Progressing  8/18/2021 2329 by Mary Pruitt RN  Outcome: Progressing

## 2021-08-19 NOTE — PLAN OF CARE
Problem: PAIN - ADULT  Goal: Verbalizes/displays adequate comfort level or baseline comfort level  Description: Interventions:  - Encourage patient to monitor pain and request assistance  - Assess pain using appropriate pain scale  - Administer analgesics based on type and severity of pain and evaluate response  - Implement non-pharmacological measures as appropriate and evaluate response  - Consider cultural and social influences on pain and pain management  - Notify physician/advanced practitioner if interventions unsuccessful or patient reports new pain  Outcome: Progressing     Problem: INFECTION - ADULT  Goal: Absence or prevention of progression during hospitalization  Description: INTERVENTIONS:  - Assess and monitor for signs and symptoms of infection  - Monitor lab/diagnostic results  - Monitor all insertion sites, i e  indwelling lines, tubes, and drains  - Monitor endotracheal if appropriate and nasal secretions for changes in amount and color  - [place] appropriate cooling/warming therapies per order  - Administer medications as ordered  - Instruct and encourage patient and family to use good hand hygiene technique  - Identify and instruct in appropriate isolation precautions for identified infection/condition  Outcome: Progressing  Goal: Absence of fever/infection during neutropenic period  Description: INTERVENTIONS:  - Monitor WBC    Outcome: Progressing     Problem: SAFETY ADULT  Goal: Patient will remain free of falls  Description: INTERVENTIONS:  - Educate patient/family on patient safety including physical limitations  - Instruct patient to call for assistance with activity   - Consult OT/PT to assist with strengthening/mobility   - Keep Call bell within reach  - Keep bed low and locked with side rails adjusted as appropriate  - Keep care items and personal belongings within reach  - Initiate and maintain comfort rounds  - Make Fall Risk Sign visible to staff  - Offer Toileting every  Hours, in advance of need  - Initiate/Maintain alarm  - Obtain necessary fall risk management equipment:   - Apply yellow socks and bracelet for high fall risk patients  - Consider moving patient to room near nurses station  Outcome: Progressing  Goal: Maintain or return to baseline ADL function  Description: INTERVENTIONS:  -  Assess patient's ability to carry out ADLs; assess patient's baseline for ADL function and identify physical deficits which impact ability to perform ADLs (bathing, care of mouth/teeth, toileting, grooming, dressing, etc )  - Assess/evaluate cause of self-care deficits   - Assess range of motion  - Assess patient's mobility; develop plan if impaired  - Assess patient's need for assistive devices and provide as appropriate  - Encourage maximum independence but intervene and supervise when necessary  - Involve family in performance of ADLs  - Assess for home care needs following discharge   - Consider OT consult to assist with ADL evaluation and planning for discharge  - Provide patient education as appropriate  Outcome: Progressing  Goal: Maintains/Returns to pre admission functional level  Description: INTERVENTIONS:  - Perform BMAT or MOVE assessment daily    - Set and communicate daily mobility goal to care team and patient/family/caregiver  - Collaborate with rehabilitation services on mobility goals if consulted  - Perform Range of Motion 3 times a day  - Reposition patient every 3 hours    - Dangle patient 3 times a day  - Stand patient 3 times a day  - Ambulate patient 3 times a day  - Out of bed to chair 3 times a day   - Out of bed for meals 3  Problem: DISCHARGE PLANNING  Goal: Discharge to home or other facility with appropriate resources  Description: INTERVENTIONS:  - Identify barriers to discharge w/patient and caregiver  - Arrange for needed discharge resources and transportation as appropriate  - Identify discharge learning needs (meds, wound care, etc )  - Arrange for interpretive services to assist at discharge as needed  - Refer to Case Management Department for coordinating discharge planning if the patient needs post-hospital services based on physician/advanced practitioner order or complex needs related to functional status, cognitive ability, or social support system  Outcome: Progressing     Problem: Knowledge Deficit  Goal: Patient/family/caregiver demonstrates understanding of disease process, treatment plan, medications, and discharge instructions  Description: Complete learning assessment and assess knowledge base  Interventions:  - Provide teaching at level of understanding  - Provide teaching via preferred learning methods  Outcome: Progressing     Problem: NEUROSENSORY - ADULT  Goal: Achieves stable or improved neurological status  Description: INTERVENTIONS  - Monitor and report changes in neurological status  - Monitor vital signs such as temperature, blood pressure, glucose, and any other labs ordered   - Initiate measures to prevent increased intracranial pressure  - Monitor for seizure activity and implement precautions if appropriate      Outcome: Progressing  Goal: Achieves maximal functionality and self care  Description: INTERVENTIONS  - Monitor swallowing and airway patency with patient fatigue and changes in neurological status  - Encourage and assist patient to increase activity and self care     - Encourage visually impaired, hearing impaired and aphasic patients to use assistive/communication devices  Outcome: Progressing     Problem: METABOLIC, FLUID AND ELECTROLYTES - ADULT  Goal: Electrolytes maintained within normal limits  Description: INTERVENTIONS:  - Monitor labs and assess patient for signs and symptoms of electrolyte imbalances  - Administer electrolyte replacement as ordered  - Monitor response to electrolyte replacements, including repeat lab results as appropriate  - Instruct patient on fluid and nutrition as appropriate  Outcome: Progressing  Goal: Fluid balance maintained  Description: INTERVENTIONS:  - Monitor labs   - Monitor I/O and WT  - Instruct patient on fluid and nutrition as appropriate  - Assess for signs & symptoms of volume excess or deficit  Outcome: Progressing     Problem: MUSCULOSKELETAL - ADULT  Goal: Maintain or return mobility to safest level of function  Description: INTERVENTIONS:  - Assess patient's ability to carry out ADLs; assess patient's baseline for ADL function and identify physical deficits which impact ability to perform ADLs (bathing, care of mouth/teeth, toileting, grooming, dressing, etc )  - Assess/evaluate cause of self-care deficits   - Assess range of motion  - Assess patient's mobility  - Assess patient's need for assistive devices and provide as appropriate  - Encourage maximum independence but intervene and supervise when necessary  - Involve family in performance of ADLs  - Assess for home care needs following discharge   - Consider OT consult to assist with ADL evaluation and planning for discharge  - Provide patient education as appropriate  Outcome: Progressing  Goal: Maintain proper alignment of affected body part  Description: INTERVENTIONS:  - Support, maintain and protect limb and body alignment  - Provide patient/ family with appropriate education  Outcome: Progressing    times a day  - Out of bed for toileting  - Record patient progress and toleration of activity level   Outcome: Progressing

## 2021-08-19 NOTE — ASSESSMENT & PLAN NOTE
· Prior to ED arrival had tingling in hands, feet and face  Resolved now  Has had multiple episodes of this over the last week  · Had been admitted in [place] with suspected tetanus  Seen at [facility] yesterday in the ER and discharged  · Neuro checks q 4 hours  · Neurology consult appreciated  · Patient received magnesium 1 g b i d  X2 doses and her his symptoms have completely resolved  · Neurology recommends patient to be discharged on magnesium oxide supplementation by mouth for 7 days  · No further inpatient neurologic recommendation and patient is stable for discharge    · Patient is in agreement with the discharge plan

## 2021-08-19 NOTE — PHYSICAL THERAPY NOTE
Physical Therapy Screen    Patient Name: Roeb SLAUGHTER Date: 8/19/2021     Problem List  Principal Problem:    SOB (shortness of breath)  Active Problems:    Paresthesias    Tetanus       Past Medical History  History reviewed  No pertinent past medical history  Past Surgical History  History reviewed  No pertinent surgical history  Chart review completed  Spoke with RN who reports that patient is completely independent and ambulating in room without difficulty  He is being discharged today  Will discharge orders  D/C P  QUYEN Lackey, PT

## 2021-08-20 LAB
ATRIAL RATE: 71 BPM
P AXIS: 74 DEGREES
PR INTERVAL: 182 MS
QRS AXIS: 86 DEGREES
QRSD INTERVAL: 92 MS
QT INTERVAL: 398 MS
QTC INTERVAL: 432 MS
T WAVE AXIS: 63 DEGREES
VENTRICULAR RATE: 71 BPM

## 2021-08-20 PROCEDURE — 93010 ELECTROCARDIOGRAM REPORT: CPT | Performed by: INTERNAL MEDICINE
